# Patient Record
Sex: FEMALE | Race: OTHER | HISPANIC OR LATINO | Employment: UNEMPLOYED | URBAN - METROPOLITAN AREA
[De-identification: names, ages, dates, MRNs, and addresses within clinical notes are randomized per-mention and may not be internally consistent; named-entity substitution may affect disease eponyms.]

---

## 2017-02-10 LAB
ABO + RH BLD: NORMAL
C TRACH RRNA SPEC QL PROBE: NEGATIVE
HBV SURFACE AG SERPL QL IA: NEGATIVE
HCT VFR BLD AUTO: 38 % (ref 36–46)
HGB BLD-MCNC: 12 G/DL (ref 12–16)
INDIRECT COOMBS: NEGATIVE
MCV RBC AUTO: 97 FL (ref 82–108)
N GONORRHOEAE, AMPLIFIED DNA: NEGATIVE
PLATELET # BLD AUTO: 364 K/ΜL (ref 150–399)
RPR: NEGATIVE
RUBELLA IMMUNE STATUS: NORMAL

## 2017-03-10 DIAGNOSIS — Z34.91 NORMAL PREGNANCY IN FIRST TRIMESTER: Primary | ICD-10-CM

## 2017-03-21 ENCOUNTER — OFFICE VISIT (OUTPATIENT)
Dept: MATERNAL FETAL MEDICINE | Facility: CLINIC | Age: 27
End: 2017-03-21
Payer: MEDICAID

## 2017-03-21 DIAGNOSIS — Z36.89 ENCOUNTER FOR FETAL ANATOMIC SURVEY: Primary | ICD-10-CM

## 2017-03-21 DIAGNOSIS — Z34.91 NORMAL PREGNANCY IN FIRST TRIMESTER: ICD-10-CM

## 2017-03-21 PROCEDURE — 76816 OB US FOLLOW-UP PER FETUS: CPT | Mod: 26,S$PBB,, | Performed by: PEDIATRICS

## 2017-03-21 PROCEDURE — 99499 UNLISTED E&M SERVICE: CPT | Mod: S$PBB,,, | Performed by: PEDIATRICS

## 2017-03-21 PROCEDURE — 76816 OB US FOLLOW-UP PER FETUS: CPT | Mod: PBBFAC | Performed by: PEDIATRICS

## 2017-03-21 NOTE — PROGRESS NOTES
Indication  ========    Evaluation of fetal growth.    Method  ======    Transabdominal ultrasound examination.    Pregnancy  =========    [facility] pregnancy. Number of fetuses: 1.    Dating  ======    LMP on: 11/27/2016  GA by LMP 16 w + 2 d  ALYSSA by LMP: 9/3/2017  Ultrasound examination on: 3/21/2017  GA by U/S based upon: AC, BPD, Femur, HC  GA by U/S 16 w + 3 d  ALYSSA by U/S: 9/2/2017  Assigned: Dating performed on 03/21/2017, based on the LMP  Assigned GA 16 w + 2 d  Assigned ALYSSA: 9/3/2017    General Evaluation  ==============    Cardiac activity: present.  bpm.  Fetal movements: visualized.  Presentation: breech.  Placenta:  Placental site: anterior.  Umbilical cord: Cord vessels: 3 vessel cord.  Amniotic fluid: Amount of AF: normal amount.    Fetal Biometry  ============    Fetal Biometry  BPD 33.7 mm 55% 16w 3d Hadlock  OFD 43.7 mm 73% 16w 6d Gerardo  .9 mm 37% 16w 2d Hadlock  .1 mm 63% 16w 4d Hadlock  Femur 21.2 mm 47% 16w 2d Hadlock  Cerebellum tr 15.2 mm 21% 16w 1d Mazariegos  CM 2.5 mm 7% Nicolaides  Nuchal fold 2.95 mm  Humerus 21.9 mm 69% 16w 5d Gerardo   g 52% 16w 2d Hadlock  Calculated by: Hadlock (BPD-HC-AC-FL)  EFW (lb) 0 lb  EFW (oz) 6 oz  Cephalic index 0.77 23% Nicolaides  HC / AC 1.17 26% Hadlock  FL / BPD 0.63 64% Hadlock  FL / AC 0.20 48% Hadlock   bpm    Fetal Anatomy  ============    Cranium: normal  Lateral ventricles: normal  Choroid plexus: normal  Midline falx: suboptimal  Cavum septi pellucidi: suboptimal  Cerebellum: normal  Cisterna magna: normal  Lips: suboptimal  Profile: suboptimal  Nose: suboptimal  4-chamber view: suboptimal  RVOT: suboptimal  LVOT: suboptimal  Heart / Thorax: septum is sub opt  Aortic arch: suboptimal  Diaphragm: normal  Cord insertion: normal  Stomach: normal  Kidneys: normal  Bladder: normal  Genitals: normal  Cervical spine: suboptimal  Thoracic spine: suboptimal  Lumbar spine: suboptimal  Sacral spine: suboptimal  Arms: both  visualized  Legs: both visualized  Wants to know gender: no    Maternal Structures  ===============    Uterus / Cervix  Cervical length 48.0 mm              Impression  =========    Limited anatomy was negative. No anomalies seen.    AFV normal.    US used for dating. Biometry consistent and concordant with US generated dates.        Recommendation  ==============    With your permission, we would like to re-evaluate fetal growth and surveillance and maternal health in 4 - 6 weeks.    Thank you for allowing us to participate in the care of your patients. If you have any questions concerning today's consultation feel free to  contact me or one of my partners. We can be reached at [phone] during normal business hours. If you have a question after normal  business hours, please contact Labor and Delivery [phone] and the unit secretary will page our on call physician.

## 2017-03-24 ENCOUNTER — TELEPHONE (OUTPATIENT)
Dept: OBSTETRICS AND GYNECOLOGY | Facility: CLINIC | Age: 27
End: 2017-03-24

## 2017-03-24 NOTE — TELEPHONE ENCOUNTER
Called to schedule colp. No answer. VM recorded message was in Eritrean. Scheduled OB/Colpo appt. Mailed reminder.

## 2017-03-30 ENCOUNTER — INITIAL PRENATAL (OUTPATIENT)
Dept: OBSTETRICS AND GYNECOLOGY | Facility: CLINIC | Age: 27
End: 2017-03-30
Payer: MEDICAID

## 2017-03-30 VITALS — SYSTOLIC BLOOD PRESSURE: 120 MMHG | WEIGHT: 108.25 LBS | DIASTOLIC BLOOD PRESSURE: 70 MMHG

## 2017-03-30 DIAGNOSIS — Z34.02 ENCOUNTER FOR SUPERVISION OF NORMAL FIRST PREGNANCY IN SECOND TRIMESTER: Primary | ICD-10-CM

## 2017-03-30 DIAGNOSIS — R87.612 LGSIL ON PAP SMEAR OF CERVIX: ICD-10-CM

## 2017-03-30 PROCEDURE — 99212 OFFICE O/P EST SF 10 MIN: CPT | Mod: PBBFAC,PO | Performed by: STUDENT IN AN ORGANIZED HEALTH CARE EDUCATION/TRAINING PROGRAM

## 2017-03-30 PROCEDURE — 87086 URINE CULTURE/COLONY COUNT: CPT

## 2017-03-30 PROCEDURE — 57452 EXAM OF CERVIX W/SCOPE: CPT | Mod: PBBFAC,PO | Performed by: OBSTETRICS & GYNECOLOGY

## 2017-03-30 PROCEDURE — 99202 OFFICE O/P NEW SF 15 MIN: CPT | Mod: TH,25,S$PBB, | Performed by: STUDENT IN AN ORGANIZED HEALTH CARE EDUCATION/TRAINING PROGRAM

## 2017-03-30 PROCEDURE — 99999 PR PBB SHADOW E&M-EST. PATIENT-LVL II: CPT | Mod: PBBFAC,,, | Performed by: STUDENT IN AN ORGANIZED HEALTH CARE EDUCATION/TRAINING PROGRAM

## 2017-03-30 RX ORDER — FOLIC ACID, .BETA.-CAROTENE, ASCORBIC ACID, CHOLECALCIFEROL, .ALPHA.-TOCOPHEROL ACETATE, DL-, THIAMINE MONONITRATE, RIBOFLAVIN, NIACINAMIDE, PYRIDOXINE HYDROCHLORIDE, CYANOCOBALAMIN, CALCIUM PANTOTHENATE, CALCIUM CARBONATE, FERROUS FUMARATE, AND ZINC OXIDE 1; 1000; 100; 400; 30; 3; 3; 15; 20; 12; 7; 200; 29; 20 MG/1; [IU]/1; MG/1; [IU]/1; [IU]/1; MG/1; MG/1; MG/1; MG/1; UG/1; MG/1; MG/1; MG/1; MG/1
TABLET, CHEWABLE ORAL
Refills: 2 | COMMUNITY
Start: 2017-03-14

## 2017-03-30 NOTE — PROGRESS NOTES
Patient doing well today. She reports mild nausea. She otherwise denies pelvic cramping, denies vaginal bleeding, denies LOF.  She reports feeling some fetal movement    /70  Wt 49.1 kg (108 lb 3.9 oz)  LMP 2016    26 y.o., at 17w4d by Estimated Date of Delivery: 9/3/17  There is no problem list on file for this patient.    OB History    Para Term  AB SAB TAB Ectopic Multiple Living   1               # Outcome Date GA Lbr Vlad/2nd Weight Sex Delivery Anes PTL Lv   1 Current                   Dating reviewed    Allergies and problem list reviewed and updated    Medical and surgical history reviewed    Prenatal labs reviewed and updated    Physical Exam:  ABD: soft, gravid, nontender    Assessment:  Prenatal    Plan:   Records from [place]  LSIL on pap, colpo today, mild changes no biopsies, repeat pap PP  Appt with MFM   Declined quad screen  Will need HIV lab with T2 labs    Follow up 4 Weeks

## 2017-03-30 NOTE — MR AVS SNAPSHOT
[facility]  [address] [place] [address]  Phone: [phone]                  Klarissa Krause   3/30/2017 1:00 PM   Initial Prenatal    Descripción:  Female : 1990   Personal Médico:  Fernanda Cordova MD   Departamento:  [facility]           Razón de la kayleen     Routine Prenatal Visit     Headache     Morning Sickness           Diagnósticos de Esta Visita        Comentarios    Encounter for supervision of normal first pregnancy in second trimester    -  Primario     LGSIL on Pap smear of cervix                Lista de tareas           Citas próximas        Personal Médico Departamento Tfno del dpto    2017 9:20 AM ULTRASOUND, [facility] 4TH [facility] - Maternal Fetal Med [phone]      Metas (5 Years of Data)     Ninguna      [facility]     [facility] L[facility]nea de Enfermeras - Asistencia   Enfermeras registradas de [facility] pueden ayudarle a reservar josé kayleen, proveer educación para la hadley, asesoría clínica, y otros servicios de asesoramiento.   Llame para jamee servicio gratuito a [phone].             Medicamentos           Mensaje sobre Medicamentos     Verificar los cambios y / o adiciones a whitman régimen de medicación son los mismos que discutir con whitman médico. Si cualquiera de estos cambios o adiciones son incorrectos, por favor notifique a whitman proveedor de atención médica.             Verifique que la siguiente lista de medicamentos es josé representación exacta de los medicamentos que está tomando actualmente. Si no hay ningunos reportados, la lista puede estar en romano. Si no es correcta, por favor póngase en contacto con whitman proveedor de atención médica. Lleve esta lista con usted en izaiah de emergencia.           Medicamentos Actuales     PRENATAL 19 29 mg iron- 1 mg Chew TK 1 T PO QD           Información de referencia clínica           Prenatal Vitals     Enc. Date GA Prenatal Vitals Prenatal Pulse Pain Level Urine  Albumin/Glucose Edema Presentation Dilation/Effacement/Station    3/30/17 17w4d 120/70 / 49.1 kg (108 lb 3.9 oz)  / 152  0 Negative / Negative None / None / None        Fatmata signos vitales tenzin     PS Peso Ultima menstruación             120/70 49.1 kg (108 lb 3.9 oz) 2016         Alergias     A partir del:  3/30/2017        No Known Allergies      Vacunas     Administradas en la fecha de la visita:  3/30/2017        None      Orders Placed During Today's Visit      Órdenes normales de esta visita    ABO/Rh     CBC Without Differential     Jesus test, indirect, qualitative     Hbsag - Prenatal     Ob Cervical Screen     RPR     Rubella antibody, IgG     Urine culture       Language Assistance Services     ATTENTION: Language assistance services are available, free of charge. Please call [phone].      ATENCIÓN: Si habla español, tiene a whitman disposición servicios gratuitos de asistencia lingüística. Llame al [phone].     CHÚ Ý: N?u b?n nói Ti?ng Vi?t, có các d?ch v? h? tr? ngôn ng? mi?n phí dành cho b?n. G?i s? [phone].         [facility] cumple con las leyes federales aplicables de derechos civiles y no discrimina por motivos de bettina, color, origen nacional, edad, discapacidad, o sexo.                 Klarissa Krause   3/30/2017 1:00 PM   Initial Prenatal    Description:  Female : 1990   Provider:  Fernanda Cordova MD   Department:  [facility]/ [facility]           Reason for Visit     Routine Prenatal Visit     Headache     Morning Sickness           Diagnoses this Visit        Comments    Encounter for supervision of normal first pregnancy in second trimester    -  Primary     LGSIL on Pap smear of cervix                To Do List           Future Appointments        Provider Department Dept Phone    2017 9:20 AM ULTRASOUND, [facility] 4TH [facility] - Maternal Fetal Med [phone]      Goals     None      [facility]     [facility] Nurse Care Line -   Assistance  Unless otherwise directed by your provider, please contact [facility], our nurse care line that is available for 24/7 assistance.     Registered nurses in the [facility] provide: appointment scheduling, clinical advisement, health education, and other advisory services.  Call: [phone] (toll free)               Medications           Message regarding Medications     Verify the changes and/or additions to your medication regime listed below are the same as discussed with your clinician today.  If any of these changes or additions are incorrect, please notify your healthcare provider.             Verify that the below list of medications is an accurate representation of the medications you are currently taking.  If none reported, the list may be blank. If incorrect, please contact your healthcare provider. Carry this list with you in case of emergency.           Current Medications     PRENATAL 19 29 mg iron- 1 mg Chew TK 1 T PO QD           Clinical Reference Information           Prenatal Vitals     Enc. Date GA Prenatal Vitals Prenatal Pulse Pain Level Urine Albumin/Glucose Edema Presentation Dilation/Effacement/Station    3/30/17 17w4d 120/70 / 49.1 kg (108 lb 3.9 oz)  / 152  0 Negative / Negative None / None / None        Your Vitals Were     BP Weight Last Period             120/70 49.1 kg (108 lb 3.9 oz) 11/27/2016         Allergies as of 3/30/2017     No Known Allergies      Immunizations Administered on Date of Encounter - 3/30/2017     None      Orders Placed During Today's Visit      Normal Orders This Visit    ABO/Rh     CBC Without Differential     Jesus test, indirect, qualitative     Hbsag - Prenatal     Ob Cervical Screen     RPR     Rubella antibody, IgG     Urine culture       Language Assistance Services     ATTENTION: Language assistance services are available, free of charge. Please call [phone].      ATENCIÓN: Si doug sharma whitman disposición  servicios gratuitos de asistencia lingüística. Shaheed kurtz [phone].     [facility] Ý: N?u b?n nói Ti?ng Vi?t, có các d?ch v? h? tr? ngôn ng? mi?n phí dành cho b?n. G?i s? [phone].         [facility] complies with applicable Federal civil rights laws and does not discriminate on the basis of race, color, national origin, age, disability, or sex.

## 2017-03-30 NOTE — PROCEDURES
Colposcopy  Date/Time: 3/30/2017 2:04 PM  Performed by: JASMEET QUAN  Authorized by: JASMEET QUAN     Consent Done?:  Yes (Written)  Assistants?: Yes    List of assistants:  Fernanda Cordova MD   I was present for the entire procedure.    Colposcopy Site:  Cervix  Position:  Supine  Acrowhite Lesion? Yes    Atypical Vessels: No    Transformation Zone Adequate?: Yes    Biopsy?: No    ECC Performed?: No    LEEP Performed?: No     Patient tolerated the procedure well with no immediate complications.   Post-operative instructions were provided for the patient.   Patient was discharged and will follow up if any complications occur

## 2017-04-01 LAB — BACTERIA UR CULT: NO GROWTH

## 2017-04-18 ENCOUNTER — OFFICE VISIT (OUTPATIENT)
Dept: MATERNAL FETAL MEDICINE | Facility: CLINIC | Age: 27
End: 2017-04-18
Payer: MEDICAID

## 2017-04-18 DIAGNOSIS — Z36.89 ENCOUNTER FOR FETAL ANATOMIC SURVEY: ICD-10-CM

## 2017-04-18 PROCEDURE — 76805 OB US >/= 14 WKS SNGL FETUS: CPT | Mod: 26,S$PBB,, | Performed by: OBSTETRICS & GYNECOLOGY

## 2017-04-18 PROCEDURE — 99499 UNLISTED E&M SERVICE: CPT | Mod: S$PBB,,, | Performed by: OBSTETRICS & GYNECOLOGY

## 2017-04-18 PROCEDURE — 76805 OB US >/= 14 WKS SNGL FETUS: CPT | Mod: PBBFAC | Performed by: OBSTETRICS & GYNECOLOGY

## 2017-04-18 NOTE — PROGRESS NOTES
A detailed fetal anatomical ultrasound was completed today.  See official report in the imaging section of [facility].  Ultrasound noted no obvious fetal abnormalities.  Ultrasound findings consistent with established dating.  Rescan as clinically indicated.

## 2017-04-27 ENCOUNTER — ROUTINE PRENATAL (OUTPATIENT)
Dept: OBSTETRICS AND GYNECOLOGY | Facility: CLINIC | Age: 27
End: 2017-04-27
Payer: MEDICAID

## 2017-04-27 VITALS — DIASTOLIC BLOOD PRESSURE: 64 MMHG | SYSTOLIC BLOOD PRESSURE: 110 MMHG | WEIGHT: 115.5 LBS

## 2017-04-27 DIAGNOSIS — Z34.02 ENCOUNTER FOR SUPERVISION OF NORMAL FIRST PREGNANCY IN SECOND TRIMESTER: Primary | ICD-10-CM

## 2017-04-27 PROCEDURE — 99212 OFFICE O/P EST SF 10 MIN: CPT | Mod: PBBFAC,PO | Performed by: STUDENT IN AN ORGANIZED HEALTH CARE EDUCATION/TRAINING PROGRAM

## 2017-04-27 PROCEDURE — 99213 OFFICE O/P EST LOW 20 MIN: CPT | Mod: TH,S$PBB,, | Performed by: STUDENT IN AN ORGANIZED HEALTH CARE EDUCATION/TRAINING PROGRAM

## 2017-04-27 PROCEDURE — 99999 PR PBB SHADOW E&M-EST. PATIENT-LVL II: CPT | Mod: PBBFAC,,, | Performed by: STUDENT IN AN ORGANIZED HEALTH CARE EDUCATION/TRAINING PROGRAM

## 2017-04-27 RX ORDER — VALACYCLOVIR HYDROCHLORIDE 500 MG/1
TABLET, FILM COATED ORAL
Refills: 0 | COMMUNITY
Start: 2017-04-07 | End: 2017-08-02 | Stop reason: SDUPTHER

## 2017-04-27 NOTE — PROGRESS NOTES
Complaints today: none  Good fm.  Denies ctx, vb, lof.    /64  Wt 52.4 kg (115 lb 8.3 oz)  LMP 2016    26 y.o., at 21w4d by Estimated Date of Delivery: 9/3/17  Patient Active Problem List   Diagnosis    Encounter for supervision of normal first pregnancy in second trimester    LGSIL on Pap smear of cervix - colpo mild.  repeat pap pp     OB History    Para Term  AB SAB TAB Ectopic Multiple Living   1               # Outcome Date GA Lbr Vlad/2nd Weight Sex Delivery Anes PTL Lv   1 Current                   Dating reviewed    Allergies and problem list reviewed and updated    Medical and surgical history reviewed    Prenatal labs reviewed and updated    Physical Exam:  ABD: soft, gravid, nontender,     Assessment:  Klarissa was seen today for routine prenatal visit.    Diagnoses and all orders for this visit:    Encounter for supervision of normal first pregnancy in second trimester         Plan:      follow up 4Weeks, kick counts, labor precautions

## 2017-04-27 NOTE — MR AVS SNAPSHOT
[facility]  [address] [facility] [place] [address]  Phone: [phone]                  Klarissa Krause   2017 10:00 AM   Routine Prenatal    Descripción:  Female : 1990   Personal Médico:  Conchis Au MD   Departamento:  [facility]           Razón de la kayleen     Routine Prenatal Visit           Diagnósticos de Esta Visita        Comentarios    Encounter for supervision of normal first pregnancy in second trimester    -  Primario            Lista de tarshaunna           Namitas (5 Years of Data)     Ninguna      Follow-Up and Disposition     Return in about 4 weeks (around 2017).      [facility]     [facility] Línea de Enfermeras - Asistencia   Enfermeras registradas de [facility] pueden ayudarle a reservar josé kayleen, proveer educación para la hadley, asesoría clínica, y otros servicios de asesoramiento.   Llame para jamee servicio gratuito a [phone].             Medicamentos           Mensaje sobre Medicamentos     Verificar los cambios y / o adiciones a whitman régimen de medicación son los mismos que discutir con whitman médico. Si cualquiera de estos cambios o adiciones son incorrectos, por favor notifique a whitman proveedor de atención médica.             Verifique que la siguiente lista de medicamentos es josé representación exacta de los medicamentos que está tomando actualmente. Si no hay ningunos reportados, la lista puede estar en romano. Si no es correcta, por favor póngase en contacto con whitman proveedor de atención médica. Lleve esta lista con usted en izaiah de emergencia.           Medicamentos Actuales     PRENATAL 19 29 mg iron- 1 mg Chew TK 1 T PO QD    valacyclovir (VALTREX) 500 MG tablet TK 1 T PO  Q  12  H  FOR   3  DAYS           Información de referencia clínica           Prenatal Vitals     Enc. Date GA Prenatal Vitals Prenatal Pulse Pain Level Urine Albumin/Glucose Edema Presentation Dilation/Effacement/Station    17 21w4d 110/64  / 52.4 kg (115 lb 8.3 oz)   0 Negative / Negative       3/30/17 17w4d 120/70 / 49.1 kg (108 lb 3.9 oz)  / 152  0 Negative / Negative None / None / None        Fatmata signos vitales tenzin     PS Peso Ultima menstruación             110/64 52.4 kg (115 lb 8.3 oz) 2016         Alergias     A partir del:  2017        No Known Allergies      Vacunas     Administradas en la fecha de la visita:  2017        None      Language Assistance Services     ATTENTION: Language assistance services are available, free of charge. Please call [phone].      ATENCIÓN: Si habla español, tiene a whitman disposición servicios gratuitos de asistencia lingüística. Llame al [phone].     CHÚ Ý: N?u b?n nói Ti?ng Vi?t, có các d?ch v? h? tr? ngôn ng? mi?n phí dành cho b?n. G?i s? [phone].         [facility] - [facility]/ G[facility] cumple con las leyes federales aplicables de derechos civiles y no discrimina por motivos de bettina, color, origen nacional, edad, discapacidad, o sexo.                 Klarissa Jimi   2017 10:00 AM   Routine Prenatal    Description:  Female : 1990   Provider:  Conchis Au MD   Department:  [facility]/ G[facility]           Reason for Visit     Routine Prenatal Visit           Diagnoses this Visit        Comments    Encounter for supervision of normal first pregnancy in second trimester    -  Primary            To Do List           Goals     None      Follow-Up and Disposition     Return in about 4 weeks (around 2017).      [facility]     [facility] On Call Nurse Care Line -  Assistance  Unless otherwise directed by your provider, please contact [facility], our nurse care line that is available for  assistance.     Registered nurses in the [facility] provide: appointment scheduling, clinical advisement, health education, and other advisory services.  Call: [phone] (toll free)               Medications           Message regarding Medications      Verify the changes and/or additions to your medication regime listed below are the same as discussed with your clinician today.  If any of these changes or additions are incorrect, please notify your healthcare provider.             Verify that the below list of medications is an accurate representation of the medications you are currently taking.  If none reported, the list may be blank. If incorrect, please contact your healthcare provider. Carry this list with you in case of emergency.           Current Medications     PRENATAL 19 29 mg iron- 1 mg Chew TK 1 T PO QD    valacyclovir (VALTREX) 500 MG tablet TK 1 T PO  Q  12  H  FOR   3  DAYS           Clinical Reference Information           Prenatal Vitals     Enc. Date GA Prenatal Vitals Prenatal Pulse Pain Level Urine Albumin/Glucose Edema Presentation Dilation/Effacement/Station    4/27/17 21w4d 110/64 / 52.4 kg (115 lb 8.3 oz)   0 Negative / Negative       3/30/17 17w4d 120/70 / 49.1 kg (108 lb 3.9 oz)  / 152  0 Negative / Negative None / None / None        Your Vitals Were     BP Weight Last Period             110/64 52.4 kg (115 lb 8.3 oz) 11/27/2016         Allergies as of 4/27/2017     No Known Allergies      Immunizations Administered on Date of Encounter - 4/27/2017     None      Language Assistance Services     ATTENTION: Language assistance services are available, free of charge. Please call [phone].      ATENCIÓN: Si habla español, tiene a whitman disposición servicios gratuitos de asistencia lingüística. Llame al [phone].     [facility] Ý: N?u b?n nói Ti?ng Vi?t, có các d?ch v? h? tr? ngôn ng? mi?n phí dành cho b?n. G?i s? [phone].         [facility] complies with applicable Federal civil rights laws and does not discriminate on the basis of race, color, national origin, age, disability, or sex.

## 2017-05-25 ENCOUNTER — ROUTINE PRENATAL (OUTPATIENT)
Dept: OBSTETRICS AND GYNECOLOGY | Facility: CLINIC | Age: 27
End: 2017-05-25
Payer: MEDICAID

## 2017-05-25 VITALS — DIASTOLIC BLOOD PRESSURE: 60 MMHG | WEIGHT: 125 LBS | SYSTOLIC BLOOD PRESSURE: 112 MMHG

## 2017-05-25 DIAGNOSIS — Z34.02 ENCOUNTER FOR SUPERVISION OF NORMAL FIRST PREGNANCY IN SECOND TRIMESTER: Primary | ICD-10-CM

## 2017-05-25 PROCEDURE — 99213 OFFICE O/P EST LOW 20 MIN: CPT | Mod: TH,S$PBB,, | Performed by: STUDENT IN AN ORGANIZED HEALTH CARE EDUCATION/TRAINING PROGRAM

## 2017-05-25 PROCEDURE — 99999 PR PBB SHADOW E&M-EST. PATIENT-LVL II: CPT | Mod: PBBFAC,,, | Performed by: STUDENT IN AN ORGANIZED HEALTH CARE EDUCATION/TRAINING PROGRAM

## 2017-05-25 PROCEDURE — 99212 OFFICE O/P EST SF 10 MIN: CPT | Mod: PBBFAC,PO | Performed by: STUDENT IN AN ORGANIZED HEALTH CARE EDUCATION/TRAINING PROGRAM

## 2017-08-02 ENCOUNTER — ROUTINE PRENATAL (OUTPATIENT)
Dept: OBSTETRICS AND GYNECOLOGY | Facility: CLINIC | Age: 27
End: 2017-08-02
Payer: MEDICAID

## 2017-08-02 VITALS — SYSTOLIC BLOOD PRESSURE: 110 MMHG | WEIGHT: 139.31 LBS | DIASTOLIC BLOOD PRESSURE: 70 MMHG

## 2017-08-02 DIAGNOSIS — B00.9 HSV (HERPES SIMPLEX VIRUS) INFECTION: ICD-10-CM

## 2017-08-02 DIAGNOSIS — Z34.02 ENCOUNTER FOR SUPERVISION OF NORMAL FIRST PREGNANCY IN SECOND TRIMESTER: Primary | ICD-10-CM

## 2017-08-02 PROCEDURE — 99999 PR PBB SHADOW E&M-EST. PATIENT-LVL III: CPT | Mod: PBBFAC,,, | Performed by: STUDENT IN AN ORGANIZED HEALTH CARE EDUCATION/TRAINING PROGRAM

## 2017-08-02 PROCEDURE — 99213 OFFICE O/P EST LOW 20 MIN: CPT | Mod: TH,S$PBB,, | Performed by: STUDENT IN AN ORGANIZED HEALTH CARE EDUCATION/TRAINING PROGRAM

## 2017-08-02 PROCEDURE — 99213 OFFICE O/P EST LOW 20 MIN: CPT | Mod: PBBFAC,PO | Performed by: STUDENT IN AN ORGANIZED HEALTH CARE EDUCATION/TRAINING PROGRAM

## 2017-08-02 PROCEDURE — 87081 CULTURE SCREEN ONLY: CPT

## 2017-08-02 RX ORDER — VALACYCLOVIR HYDROCHLORIDE 500 MG/1
500 TABLET, FILM COATED ORAL 2 TIMES DAILY
Qty: 60 TABLET | Refills: 1 | Status: SHIPPED | OUTPATIENT
Start: 2017-08-02 | End: 2017-08-15 | Stop reason: SDUPTHER

## 2017-08-02 NOTE — PROGRESS NOTES
Complaints today: none.     /70   Wt 63.2 kg (139 lb 5.3 oz)   LMP 2016     26 y.o., at 35w3d by Estimated Date of Delivery: 9/3/17  Patient Active Problem List   Diagnosis    Encounter for supervision of normal first pregnancy in second trimester    LGSIL on Pap smear of cervix - colpo mild.  repeat pap pp     OB History    Para Term  AB Living   1             SAB TAB Ectopic Multiple Live Births                  # Outcome Date GA Lbr Vlad/2nd Weight Sex Delivery Anes PTL Lv   1 Current                   Dating reviewed    Allergies and problem list reviewed and updated    Medical and surgical history reviewed    Prenatal labs reviewed and updated    Physical Exam:  ABD: soft, gravid, nontender, fundal height at 35 weeks    Assessment:  G1 at 35w3d    Plan:   IUP at 35weeks  - GBS collected and sent today  - 3T labs ordered  - A1C ordered as patient did not undergo glucose challenge test    Inconsistent Pre  Care  - Patient dated by 16 week ultrasound, consistent with LMP  - Attempting to get full records at this time.    HSV 2  - Valtrex is on her MAR. On PMH. Patient reports was told she had herpes here in January. However, she was not seen by [facility] in January. AFTER REVIEWING [place] RECORDS IT APPEARS PATIENT TESTED POSITIVE FOR HSV 2 IN MARCH. PATIENT ADMITS TO CLUSTERS AND ERUPTIONS AROUND THAT TIME. Counseled patient she will need to be on valtrex 500mg BID until delivery. Will send to pharmacy    Tyrone Brooks MD  OB/GYN PGY-2   Pager: [phone]

## 2017-08-02 NOTE — PROGRESS NOTES
Needs labs, no OB glucose screen recorded, not seen since 5/25/17  Labs ordered, apparently pt had culture dx of herpes on 3/15/2017. Valtrex ordered, patient informed that she must start taking Valtrex.  Doing well, no questions,no problems.  I have reviewed the resident's note, evaluated the patient and agree with the diagnosis and management plan.

## 2017-08-04 LAB — BACTERIA SPEC AEROBE CULT: NORMAL

## 2017-08-08 ENCOUNTER — LAB VISIT (OUTPATIENT)
Dept: LAB | Facility: HOSPITAL | Age: 27
End: 2017-08-08
Attending: OBSTETRICS & GYNECOLOGY
Payer: MEDICAID

## 2017-08-08 ENCOUNTER — ROUTINE PRENATAL (OUTPATIENT)
Dept: OBSTETRICS AND GYNECOLOGY | Facility: CLINIC | Age: 27
End: 2017-08-08
Payer: MEDICAID

## 2017-08-08 VITALS — WEIGHT: 139.56 LBS | DIASTOLIC BLOOD PRESSURE: 64 MMHG | SYSTOLIC BLOOD PRESSURE: 118 MMHG

## 2017-08-08 DIAGNOSIS — Z34.02 ENCOUNTER FOR SUPERVISION OF NORMAL FIRST PREGNANCY IN SECOND TRIMESTER: ICD-10-CM

## 2017-08-08 DIAGNOSIS — Z3A.36 36 WEEKS GESTATION OF PREGNANCY: Primary | ICD-10-CM

## 2017-08-08 DIAGNOSIS — Z3A.36 36 WEEKS GESTATION OF PREGNANCY: ICD-10-CM

## 2017-08-08 DIAGNOSIS — Z34.93 ENCOUNTER FOR SUPERVISION OF NORMAL PREGNANCY IN THIRD TRIMESTER, UNSPECIFIED GRAVIDITY: ICD-10-CM

## 2017-08-08 LAB
BASOPHILS # BLD AUTO: 0.02 K/UL
BASOPHILS NFR BLD: 0.3 %
DIFFERENTIAL METHOD: ABNORMAL
EOSINOPHIL # BLD AUTO: 0 K/UL
EOSINOPHIL NFR BLD: 0.2 %
ERYTHROCYTE [DISTWIDTH] IN BLOOD BY AUTOMATED COUNT: 14.3 %
GLUCOSE SERPL-MCNC: 136 MG/DL
HCT VFR BLD AUTO: 30.8 %
HGB BLD-MCNC: 9.9 G/DL
LYMPHOCYTES # BLD AUTO: 1.2 K/UL
LYMPHOCYTES NFR BLD: 18.3 %
MCH RBC QN AUTO: 26.6 PG
MCHC RBC AUTO-ENTMCNC: 32.1 G/DL
MCV RBC AUTO: 83 FL
MONOCYTES # BLD AUTO: 0.6 K/UL
MONOCYTES NFR BLD: 9 %
NEUTROPHILS # BLD AUTO: 4.6 K/UL
NEUTROPHILS NFR BLD: 71.9 %
PLATELET # BLD AUTO: 301 K/UL
PMV BLD AUTO: 11.1 FL
RBC # BLD AUTO: 3.72 M/UL
WBC # BLD AUTO: 6.35 K/UL

## 2017-08-08 PROCEDURE — 83036 HEMOGLOBIN GLYCOSYLATED A1C: CPT

## 2017-08-08 PROCEDURE — 99213 OFFICE O/P EST LOW 20 MIN: CPT | Mod: TH,S$PBB,, | Performed by: ADVANCED PRACTICE MIDWIFE

## 2017-08-08 PROCEDURE — 3008F BODY MASS INDEX DOCD: CPT | Mod: ,,, | Performed by: ADVANCED PRACTICE MIDWIFE

## 2017-08-08 PROCEDURE — 85025 COMPLETE CBC W/AUTO DIFF WBC: CPT

## 2017-08-08 PROCEDURE — 99999 PR PBB SHADOW E&M-EST. PATIENT-LVL II: CPT | Mod: PBBFAC,,, | Performed by: ADVANCED PRACTICE MIDWIFE

## 2017-08-08 PROCEDURE — 36415 COLL VENOUS BLD VENIPUNCTURE: CPT

## 2017-08-08 PROCEDURE — 86703 HIV-1/HIV-2 1 RESULT ANTBDY: CPT

## 2017-08-08 PROCEDURE — 86592 SYPHILIS TEST NON-TREP QUAL: CPT

## 2017-08-08 PROCEDURE — 82947 ASSAY GLUCOSE BLOOD QUANT: CPT

## 2017-08-08 NOTE — PROGRESS NOTES
In with no c/o or concerns, reports good FM, reinforced BID. 3rd tri labs ordered today. Denies LOF or bleeding. Reviewed s/s active labor, rom, bleeding and if occur report to L&D.

## 2017-08-09 LAB
ESTIMATED AVG GLUCOSE: 97 MG/DL
HBA1C MFR BLD HPLC: 5 %
HIV 1+2 AB+HIV1 P24 AG SERPL QL IA: NEGATIVE
RPR SER QL: NORMAL

## 2017-08-14 ENCOUNTER — TELEPHONE (OUTPATIENT)
Dept: OBSTETRICS AND GYNECOLOGY | Facility: CLINIC | Age: 27
End: 2017-08-14

## 2017-08-15 ENCOUNTER — ROUTINE PRENATAL (OUTPATIENT)
Dept: OBSTETRICS AND GYNECOLOGY | Facility: CLINIC | Age: 27
End: 2017-08-15
Payer: MEDICAID

## 2017-08-15 VITALS — SYSTOLIC BLOOD PRESSURE: 110 MMHG | WEIGHT: 144.19 LBS | DIASTOLIC BLOOD PRESSURE: 78 MMHG

## 2017-08-15 DIAGNOSIS — Z3A.37 37 WEEKS GESTATION OF PREGNANCY: ICD-10-CM

## 2017-08-15 DIAGNOSIS — B00.9 HSV (HERPES SIMPLEX VIRUS) INFECTION: Primary | ICD-10-CM

## 2017-08-15 PROCEDURE — 99214 OFFICE O/P EST MOD 30 MIN: CPT | Mod: TH,S$PBB,, | Performed by: OBSTETRICS & GYNECOLOGY

## 2017-08-15 PROCEDURE — 99212 OFFICE O/P EST SF 10 MIN: CPT | Mod: PBBFAC,PO | Performed by: OBSTETRICS & GYNECOLOGY

## 2017-08-15 PROCEDURE — 99999 PR PBB SHADOW E&M-EST. PATIENT-LVL II: CPT | Mod: PBBFAC,,, | Performed by: OBSTETRICS & GYNECOLOGY

## 2017-08-15 PROCEDURE — 3008F BODY MASS INDEX DOCD: CPT | Mod: ,,, | Performed by: OBSTETRICS & GYNECOLOGY

## 2017-08-15 RX ORDER — VALACYCLOVIR HYDROCHLORIDE 500 MG/1
500 TABLET, FILM COATED ORAL 2 TIMES DAILY
Qty: 60 TABLET | Refills: 1 | Status: SHIPPED | OUTPATIENT
Start: 2017-08-15 | End: 2022-07-07

## 2017-08-15 NOTE — TELEPHONE ENCOUNTER
----- Message from Prachi Russell LPN sent at 8/14/2017  1:21 PM CDT -----  Mild anemia.  Needs to take 325 mg in addition to pnv

## 2017-08-15 NOTE — TELEPHONE ENCOUNTER
Called to inform pt that she has mild anemia and needs to take 325 mg in addition to PNV per Dr. Polanco. No answer. Left VM Message.

## 2017-08-15 NOTE — PROGRESS NOTES
Complaints today:Doing well.  Rare ctx.    /78   Wt 65.4 kg (144 lb 2.9 oz)   LMP 2016     26 y.o., at 37w2d by Estimated Date of Delivery: 9/3/17  Patient Active Problem List   Diagnosis    Encounter for supervision of normal first pregnancy in second trimester    LGSIL on Pap smear of cervix - colpo mild.  repeat pap pp    HSV (herpes simplex virus) infection     OB History    Para Term  AB Living   1             SAB TAB Ectopic Multiple Live Births                  # Outcome Date GA Lbr Vlad/2nd Weight Sex Delivery Anes PTL Lv   1 Current                   Dating reviewed    Allergies and problem list reviewed and updated    Medical and surgical history reviewed    Prenatal labs reviewed and updated    Physical Exam:  ABD: soft, gravid, nontender, FH 37; SVE:  .5/50/-3    Assessment:  IUP at 37w2d     Plan:   Labor prec   follow up 1 Weeks, kick counts, labor precautions    Will start Valtrex for HSV prophylaxis.

## 2017-08-24 ENCOUNTER — ROUTINE PRENATAL (OUTPATIENT)
Dept: OBSTETRICS AND GYNECOLOGY | Facility: CLINIC | Age: 27
End: 2017-08-24
Payer: MEDICAID

## 2017-08-24 VITALS — WEIGHT: 145.75 LBS | SYSTOLIC BLOOD PRESSURE: 110 MMHG | DIASTOLIC BLOOD PRESSURE: 70 MMHG

## 2017-08-24 DIAGNOSIS — Z34.03 ENCOUNTER FOR SUPERVISION OF NORMAL FIRST PREGNANCY IN THIRD TRIMESTER: Primary | ICD-10-CM

## 2017-08-24 PROCEDURE — 3008F BODY MASS INDEX DOCD: CPT | Mod: ,,, | Performed by: STUDENT IN AN ORGANIZED HEALTH CARE EDUCATION/TRAINING PROGRAM

## 2017-08-24 PROCEDURE — 99213 OFFICE O/P EST LOW 20 MIN: CPT | Mod: TH,S$PBB,, | Performed by: STUDENT IN AN ORGANIZED HEALTH CARE EDUCATION/TRAINING PROGRAM

## 2017-08-24 PROCEDURE — 99213 OFFICE O/P EST LOW 20 MIN: CPT | Mod: PBBFAC,PO | Performed by: STUDENT IN AN ORGANIZED HEALTH CARE EDUCATION/TRAINING PROGRAM

## 2017-08-24 PROCEDURE — 99999 PR PBB SHADOW E&M-EST. PATIENT-LVL III: CPT | Mod: PBBFAC,,, | Performed by: STUDENT IN AN ORGANIZED HEALTH CARE EDUCATION/TRAINING PROGRAM

## 2017-08-24 NOTE — PROGRESS NOTES
Complaints today: Denies nausea/vomiting, Cp, SOB, contractions, vaginal bleeding, or loss of fluid.    /70   Wt 66.1 kg (145 lb 11.6 oz)   LMP 2016     27 y.o., at 38w4d by Estimated Date of Delivery: 9/3/17  Patient Active Problem List   Diagnosis    Encounter for supervision of normal first pregnancy in second trimester    LGSIL on Pap smear of cervix - colpo mild.  repeat pap pp    HSV (herpes simplex virus) infection     OB History    Para Term  AB Living   1             SAB TAB Ectopic Multiple Live Births                  # Outcome Date GA Lbr Vlad/2nd Weight Sex Delivery Anes PTL Lv   1 Current                   Dating reviewed    Allergies and problem list reviewed and updated    Medical and surgical history reviewed    Prenatal labs reviewed and updated    Physical Exam:  ABD: soft, gravid, nontender,     Assessment:  Klarissa was seen today for routine prenatal visit.    Diagnoses and all orders for this visit:    HSV (herpes simplex virus) infection    Encounter for supervision of normal first pregnancy in third trimester          Plan:   Klarissa was seen today for routine prenatal visit.    Diagnoses and all orders for this visit:    HSV (herpes simplex virus) infection    Encounter for supervision of normal first pregnancy in third trimester    - Continue taking valtrex  - Speculum exam upon labor      follow up 1Weeks, kick counts, labor precautions given

## 2017-08-29 ENCOUNTER — ANESTHESIA EVENT (OUTPATIENT)
Dept: OBSTETRICS AND GYNECOLOGY | Facility: OTHER | Age: 27
End: 2017-08-29
Payer: MEDICAID

## 2017-08-29 ENCOUNTER — ANESTHESIA (OUTPATIENT)
Dept: OBSTETRICS AND GYNECOLOGY | Facility: OTHER | Age: 27
End: 2017-08-29
Payer: MEDICAID

## 2017-08-29 ENCOUNTER — HOSPITAL ENCOUNTER (INPATIENT)
Facility: OTHER | Age: 27
LOS: 2 days | Discharge: HOME | End: 2017-08-31
Attending: OBSTETRICS & GYNECOLOGY | Admitting: OBSTETRICS & GYNECOLOGY
Payer: MEDICAID

## 2017-08-29 DIAGNOSIS — O42.90 PROM (PREMATURE RUPTURE OF MEMBRANES): ICD-10-CM

## 2017-08-29 DIAGNOSIS — Z3A.39 39 WEEKS GESTATION OF PREGNANCY: ICD-10-CM

## 2017-08-29 DIAGNOSIS — O42.90 PREMATURE RUPTURE OF MEMBRANES, UNSPECIFIED DURATION TO ONSET OF LABOR, UNSPECIFIED GESTATIONAL AGE: ICD-10-CM

## 2017-08-29 PROBLEM — R03.0 ELEVATED BP WITHOUT DIAGNOSIS OF HYPERTENSION: Status: ACTIVE | Noted: 2017-08-29

## 2017-08-29 LAB
ABO + RH BLD: NORMAL
ALBUMIN SERPL BCP-MCNC: 2.6 G/DL
ALLENS TEST: ABNORMAL
ALP SERPL-CCNC: 240 U/L
ALT SERPL W/O P-5'-P-CCNC: 8 U/L
ANION GAP SERPL CALC-SCNC: 12 MMOL/L
AST SERPL-CCNC: 17 U/L
BASOPHILS # BLD AUTO: 0.02 K/UL
BASOPHILS NFR BLD: 0.2 %
BILIRUB SERPL-MCNC: 0.4 MG/DL
BLD GP AB SCN CELLS X3 SERPL QL: NORMAL
BUN SERPL-MCNC: 7 MG/DL
CALCIUM SERPL-MCNC: 9.5 MG/DL
CHLORIDE SERPL-SCNC: 110 MMOL/L
CO2 SERPL-SCNC: 15 MMOL/L
CREAT SERPL-MCNC: 0.7 MG/DL
CREAT UR-MCNC: 24.9 MG/DL
DIFFERENTIAL METHOD: ABNORMAL
EOSINOPHIL # BLD AUTO: 0.1 K/UL
EOSINOPHIL NFR BLD: 0.5 %
ERYTHROCYTE [DISTWIDTH] IN BLOOD BY AUTOMATED COUNT: 15.5 %
EST. GFR  (AFRICAN AMERICAN): >60 ML/MIN/1.73 M^2
EST. GFR  (NON AFRICAN AMERICAN): >60 ML/MIN/1.73 M^2
GLUCOSE SERPL-MCNC: 76 MG/DL
HCO3 UR-SCNC: 19.7 MMOL/L (ref 24–28)
HCT VFR BLD AUTO: 31 %
HGB BLD-MCNC: 9.7 G/DL
LYMPHOCYTES # BLD AUTO: 2.1 K/UL
LYMPHOCYTES NFR BLD: 23.2 %
MCH RBC QN AUTO: 25.3 PG
MCHC RBC AUTO-ENTMCNC: 31.3 G/DL
MCV RBC AUTO: 81 FL
MONOCYTES # BLD AUTO: 0.7 K/UL
MONOCYTES NFR BLD: 7.6 %
NEUTROPHILS # BLD AUTO: 6.3 K/UL
NEUTROPHILS NFR BLD: 68.1 %
PCO2 BLDA: 65.9 MMHG (ref 35–45)
PH SMN: 7.08 [PH] (ref 7.35–7.45)
PLATELET # BLD AUTO: 276 K/UL
PMV BLD AUTO: 11.2 FL
PO2 BLDA: 9 MMHG (ref 80–100)
POC BE: -10 MMOL/L
POC SATURATED O2: 5 % (ref 95–100)
POTASSIUM SERPL-SCNC: 3.9 MMOL/L
PROT SERPL-MCNC: 7.5 G/DL
PROT UR-MCNC: 36 MG/DL
PROT/CREAT RATIO, UR: 1.45
RBC # BLD AUTO: 3.84 M/UL
SAMPLE: ABNORMAL
SITE: ABNORMAL
SODIUM SERPL-SCNC: 137 MMOL/L
WBC # BLD AUTO: 9.22 K/UL

## 2017-08-29 PROCEDURE — 86850 RBC ANTIBODY SCREEN: CPT

## 2017-08-29 PROCEDURE — 59409 OBSTETRICAL CARE: CPT | Mod: AA,,, | Performed by: ANESTHESIOLOGY

## 2017-08-29 PROCEDURE — 59409 OBSTETRICAL CARE: CPT | Mod: GB,,, | Performed by: OBSTETRICS & GYNECOLOGY

## 2017-08-29 PROCEDURE — 11000001 HC ACUTE MED/SURG PRIVATE ROOM

## 2017-08-29 PROCEDURE — 63600175 PHARM REV CODE 636 W HCPCS: Performed by: STUDENT IN AN ORGANIZED HEALTH CARE EDUCATION/TRAINING PROGRAM

## 2017-08-29 PROCEDURE — 3E033VJ INTRODUCTION OF OTHER HORMONE INTO PERIPHERAL VEIN, PERCUTANEOUS APPROACH: ICD-10-PCS | Performed by: OBSTETRICS & GYNECOLOGY

## 2017-08-29 PROCEDURE — 84156 ASSAY OF PROTEIN URINE: CPT

## 2017-08-29 PROCEDURE — 25000003 PHARM REV CODE 250: Performed by: STUDENT IN AN ORGANIZED HEALTH CARE EDUCATION/TRAINING PROGRAM

## 2017-08-29 PROCEDURE — 62326 NJX INTERLAMINAR LMBR/SAC: CPT | Performed by: ANESTHESIOLOGY

## 2017-08-29 PROCEDURE — 27800517 HC TRAY,EPIDURAL-CONTINUOUS: Performed by: ANESTHESIOLOGY

## 2017-08-29 PROCEDURE — 0KQM0ZZ REPAIR PERINEUM MUSCLE, OPEN APPROACH: ICD-10-PCS | Performed by: OBSTETRICS & GYNECOLOGY

## 2017-08-29 PROCEDURE — 99285 EMERGENCY DEPT VISIT HI MDM: CPT | Mod: 25

## 2017-08-29 PROCEDURE — 59025 FETAL NON-STRESS TEST: CPT | Mod: 26,,, | Performed by: OBSTETRICS & GYNECOLOGY

## 2017-08-29 PROCEDURE — 72100002 HC LABOR CARE, 1ST 8 HOURS

## 2017-08-29 PROCEDURE — 86920 COMPATIBILITY TEST SPIN: CPT

## 2017-08-29 PROCEDURE — 59025 FETAL NON-STRESS TEST: CPT

## 2017-08-29 PROCEDURE — 27200710 HC EPIDURAL INFUSION PUMP SET: Performed by: ANESTHESIOLOGY

## 2017-08-29 PROCEDURE — 25000003 PHARM REV CODE 250: Performed by: ANESTHESIOLOGY

## 2017-08-29 PROCEDURE — 63600175 PHARM REV CODE 636 W HCPCS: Performed by: ANESTHESIOLOGY

## 2017-08-29 PROCEDURE — 99283 EMERGENCY DEPT VISIT LOW MDM: CPT | Mod: 25,,, | Performed by: OBSTETRICS & GYNECOLOGY

## 2017-08-29 PROCEDURE — 80053 COMPREHEN METABOLIC PANEL: CPT

## 2017-08-29 PROCEDURE — 85025 COMPLETE CBC W/AUTO DIFF WBC: CPT

## 2017-08-29 PROCEDURE — 25000003 PHARM REV CODE 250: Performed by: OBSTETRICS & GYNECOLOGY

## 2017-08-29 PROCEDURE — 86900 BLOOD TYPING SEROLOGIC ABO: CPT

## 2017-08-29 RX ORDER — ONDANSETRON 2 MG/ML
4 INJECTION INTRAMUSCULAR; INTRAVENOUS ONCE
Status: COMPLETED | OUTPATIENT
Start: 2017-08-29 | End: 2017-08-29

## 2017-08-29 RX ORDER — BUPIVACAINE HYDROCHLORIDE 2.5 MG/ML
INJECTION, SOLUTION EPIDURAL; INFILTRATION; INTRACAUDAL
Status: DISPENSED
Start: 2017-08-29 | End: 2017-08-29

## 2017-08-29 RX ORDER — ONDANSETRON 8 MG/1
8 TABLET, ORALLY DISINTEGRATING ORAL EVERY 8 HOURS PRN
Status: DISCONTINUED | OUTPATIENT
Start: 2017-08-29 | End: 2017-09-01 | Stop reason: HOSPADM

## 2017-08-29 RX ORDER — MISOPROSTOL 200 UG/1
200 TABLET ORAL EVERY 6 HOURS
Status: COMPLETED | OUTPATIENT
Start: 2017-08-29 | End: 2017-08-30

## 2017-08-29 RX ORDER — FENTANYL CITRATE 50 UG/ML
INJECTION, SOLUTION INTRAMUSCULAR; INTRAVENOUS
Status: DISPENSED
Start: 2017-08-29 | End: 2017-08-30

## 2017-08-29 RX ORDER — SODIUM CHLORIDE, SODIUM LACTATE, POTASSIUM CHLORIDE, CALCIUM CHLORIDE 600; 310; 30; 20 MG/100ML; MG/100ML; MG/100ML; MG/100ML
INJECTION, SOLUTION INTRAVENOUS CONTINUOUS
Status: DISCONTINUED | OUTPATIENT
Start: 2017-08-29 | End: 2017-08-29

## 2017-08-29 RX ORDER — MISOPROSTOL 200 UG/1
200 TABLET ORAL
Status: DISCONTINUED | OUTPATIENT
Start: 2017-08-29 | End: 2017-08-29

## 2017-08-29 RX ORDER — HYDROCODONE BITARTRATE AND ACETAMINOPHEN 10; 325 MG/1; MG/1
1 TABLET ORAL EVERY 4 HOURS PRN
Status: DISCONTINUED | OUTPATIENT
Start: 2017-08-29 | End: 2017-09-01 | Stop reason: HOSPADM

## 2017-08-29 RX ORDER — FENTANYL/BUPIVACAINE/NS/PF 2MCG/ML-.1
PLASTIC BAG, INJECTION (ML) INJECTION CONTINUOUS
Status: DISCONTINUED | OUTPATIENT
Start: 2017-08-29 | End: 2017-08-29

## 2017-08-29 RX ORDER — FENTANYL/BUPIVACAINE/NS/PF 2MCG/ML-.1
PLASTIC BAG, INJECTION (ML) INJECTION CONTINUOUS PRN
Status: DISCONTINUED | OUTPATIENT
Start: 2017-08-29 | End: 2017-08-29

## 2017-08-29 RX ORDER — IBUPROFEN 600 MG/1
600 TABLET ORAL EVERY 6 HOURS
Status: DISCONTINUED | OUTPATIENT
Start: 2017-08-30 | End: 2017-09-01 | Stop reason: HOSPADM

## 2017-08-29 RX ORDER — HYDROCODONE BITARTRATE AND ACETAMINOPHEN 5; 325 MG/1; MG/1
1 TABLET ORAL EVERY 4 HOURS PRN
Status: DISCONTINUED | OUTPATIENT
Start: 2017-08-29 | End: 2017-09-01 | Stop reason: HOSPADM

## 2017-08-29 RX ORDER — OXYTOCIN/RINGER'S LACTATE 20/1000 ML
2 PLASTIC BAG, INJECTION (ML) INTRAVENOUS CONTINUOUS
Status: DISCONTINUED | OUTPATIENT
Start: 2017-08-29 | End: 2017-08-29

## 2017-08-29 RX ORDER — DOCUSATE SODIUM 100 MG/1
200 CAPSULE, LIQUID FILLED ORAL 2 TIMES DAILY PRN
Status: DISCONTINUED | OUTPATIENT
Start: 2017-08-29 | End: 2017-09-01 | Stop reason: HOSPADM

## 2017-08-29 RX ORDER — FENTANYL CITRATE 50 UG/ML
INJECTION, SOLUTION INTRAMUSCULAR; INTRAVENOUS
Status: DISPENSED
Start: 2017-08-29 | End: 2017-08-29

## 2017-08-29 RX ORDER — HYDROCORTISONE 25 MG/G
CREAM TOPICAL 3 TIMES DAILY PRN
Status: DISCONTINUED | OUTPATIENT
Start: 2017-08-29 | End: 2017-09-01 | Stop reason: HOSPADM

## 2017-08-29 RX ORDER — BUPIVACAINE HYDROCHLORIDE 2.5 MG/ML
INJECTION, SOLUTION EPIDURAL; INFILTRATION; INTRACAUDAL
Status: DISPENSED
Start: 2017-08-29 | End: 2017-08-30

## 2017-08-29 RX ORDER — OXYTOCIN/RINGER'S LACTATE 20/1000 ML
41.65 PLASTIC BAG, INJECTION (ML) INTRAVENOUS CONTINUOUS
Status: DISCONTINUED | OUTPATIENT
Start: 2017-08-29 | End: 2017-08-29

## 2017-08-29 RX ORDER — DIPHENOXYLATE HYDROCHLORIDE AND ATROPINE SULFATE 2.5; .025 MG/1; MG/1
1 TABLET ORAL ONCE
Status: COMPLETED | OUTPATIENT
Start: 2017-08-29 | End: 2017-08-29

## 2017-08-29 RX ORDER — LIDOCAINE HYDROCHLORIDE AND EPINEPHRINE 15; 5 MG/ML; UG/ML
INJECTION, SOLUTION EPIDURAL
Status: DISCONTINUED | OUTPATIENT
Start: 2017-08-29 | End: 2017-08-29

## 2017-08-29 RX ORDER — FAMOTIDINE 10 MG/ML
20 INJECTION INTRAVENOUS ONCE
Status: DISCONTINUED | OUTPATIENT
Start: 2017-08-29 | End: 2017-08-29

## 2017-08-29 RX ORDER — FENTANYL/BUPIVACAINE/NS/PF 2MCG/ML-.1
PLASTIC BAG, INJECTION (ML) INJECTION
Status: DISPENSED
Start: 2017-08-29 | End: 2017-08-29

## 2017-08-29 RX ORDER — BUPIVACAINE HYDROCHLORIDE 2.5 MG/ML
INJECTION, SOLUTION EPIDURAL; INFILTRATION; INTRACAUDAL
Status: DISCONTINUED | OUTPATIENT
Start: 2017-08-29 | End: 2017-08-29

## 2017-08-29 RX ORDER — ACETAMINOPHEN 500 MG
1000 TABLET ORAL ONCE
Status: COMPLETED | OUTPATIENT
Start: 2017-08-29 | End: 2017-08-29

## 2017-08-29 RX ORDER — SODIUM CITRATE AND CITRIC ACID MONOHYDRATE 334; 500 MG/5ML; MG/5ML
30 SOLUTION ORAL ONCE
Status: DISCONTINUED | OUTPATIENT
Start: 2017-08-29 | End: 2017-08-29

## 2017-08-29 RX ORDER — DIPHENHYDRAMINE HCL 25 MG
25 CAPSULE ORAL EVERY 4 HOURS PRN
Status: DISCONTINUED | OUTPATIENT
Start: 2017-08-29 | End: 2017-09-01 | Stop reason: HOSPADM

## 2017-08-29 RX ORDER — OXYTOCIN/RINGER'S LACTATE 20/1000 ML
20 PLASTIC BAG, INJECTION (ML) INTRAVENOUS ONCE
Status: DISCONTINUED | OUTPATIENT
Start: 2017-08-29 | End: 2017-08-29

## 2017-08-29 RX ORDER — ONDANSETRON 8 MG/1
8 TABLET, ORALLY DISINTEGRATING ORAL EVERY 8 HOURS PRN
Status: DISCONTINUED | OUTPATIENT
Start: 2017-08-29 | End: 2017-08-29

## 2017-08-29 RX ORDER — FENTANYL CITRATE 50 UG/ML
INJECTION, SOLUTION INTRAMUSCULAR; INTRAVENOUS
Status: DISCONTINUED | OUTPATIENT
Start: 2017-08-29 | End: 2017-08-29

## 2017-08-29 RX ORDER — METHYLERGONOVINE MALEATE 0.2 MG/ML
200 INJECTION INTRAVENOUS
Status: DISCONTINUED | OUTPATIENT
Start: 2017-08-29 | End: 2017-08-29

## 2017-08-29 RX ORDER — CARBOPROST TROMETHAMINE 250 UG/ML
250 INJECTION, SOLUTION INTRAMUSCULAR
Status: DISCONTINUED | OUTPATIENT
Start: 2017-08-29 | End: 2017-08-29

## 2017-08-29 RX ORDER — OXYTOCIN/RINGER'S LACTATE 20/1000 ML
41.65 PLASTIC BAG, INJECTION (ML) INTRAVENOUS CONTINUOUS
Status: DISPENSED | OUTPATIENT
Start: 2017-08-29 | End: 2017-08-30

## 2017-08-29 RX ADMIN — FENTANYL CITRATE 100 MCG: 50 INJECTION, SOLUTION INTRAMUSCULAR; INTRAVENOUS at 01:08

## 2017-08-29 RX ADMIN — SODIUM CHLORIDE, SODIUM LACTATE, POTASSIUM CHLORIDE, AND CALCIUM CHLORIDE: .6; .31; .03; .02 INJECTION, SOLUTION INTRAVENOUS at 05:08

## 2017-08-29 RX ADMIN — Medication 5 ML: at 04:08

## 2017-08-29 RX ADMIN — MISOPROSTOL 800 MCG: 200 TABLET ORAL at 07:08

## 2017-08-29 RX ADMIN — BUPIVACAINE HYDROCHLORIDE 5 ML: 2.5 INJECTION, SOLUTION EPIDURAL; INFILTRATION; INTRACAUDAL; PERINEURAL at 01:08

## 2017-08-29 RX ADMIN — Medication 10 ML/HR: at 06:08

## 2017-08-29 RX ADMIN — FENTANYL CITRATE 100 MCG: 50 INJECTION, SOLUTION INTRAMUSCULAR; INTRAVENOUS at 06:08

## 2017-08-29 RX ADMIN — MISOPROSTOL 200 MCG: 200 TABLET ORAL at 09:08

## 2017-08-29 RX ADMIN — AMPICILLIN SODIUM 2 G: 2 INJECTION, POWDER, FOR SOLUTION INTRAMUSCULAR; INTRAVENOUS at 07:08

## 2017-08-29 RX ADMIN — GENTAMICIN SULFATE 288 MG: 40 INJECTION, SOLUTION INTRAMUSCULAR; INTRAVENOUS at 08:08

## 2017-08-29 RX ADMIN — ONDANSETRON 4 MG: 2 INJECTION INTRAMUSCULAR; INTRAVENOUS at 08:08

## 2017-08-29 RX ADMIN — CARBOPROST TROMETHAMINE 250 MCG: 250 INJECTION, SOLUTION INTRAMUSCULAR at 07:08

## 2017-08-29 RX ADMIN — Medication 2 MILLI-UNITS/MIN: at 03:08

## 2017-08-29 RX ADMIN — Medication 333 MILLI-UNITS/MIN: at 07:08

## 2017-08-29 RX ADMIN — DIPHENOXYLATE HYDROCHLORIDE AND ATROPINE SULFATE 1 TABLET: 2.5; .025 TABLET ORAL at 08:08

## 2017-08-29 RX ADMIN — LIDOCAINE HYDROCHLORIDE,EPINEPHRINE BITARTRATE 3 ML: 15; .005 INJECTION, SOLUTION EPIDURAL; INFILTRATION; INTRACAUDAL; PERINEURAL at 05:08

## 2017-08-29 RX ADMIN — FENTANYL CITRATE 100 MCG: 50 INJECTION, SOLUTION INTRAMUSCULAR; INTRAVENOUS at 10:08

## 2017-08-29 RX ADMIN — BUPIVACAINE HYDROCHLORIDE 6 ML: 2.5 INJECTION, SOLUTION EPIDURAL; INFILTRATION; INTRACAUDAL; PERINEURAL at 04:08

## 2017-08-29 RX ADMIN — Medication 41.65 MILLI-UNITS/MIN: at 09:08

## 2017-08-29 RX ADMIN — BUPIVACAINE HYDROCHLORIDE 6 ML: 2.5 INJECTION, SOLUTION EPIDURAL; INFILTRATION; INTRACAUDAL; PERINEURAL at 10:08

## 2017-08-29 RX ADMIN — Medication 10 ML: at 06:08

## 2017-08-29 RX ADMIN — ACETAMINOPHEN 1000 MG: 500 TABLET ORAL at 08:08

## 2017-08-29 RX ADMIN — Medication 5 ML: at 01:08

## 2017-08-29 NOTE — HOSPITAL COURSE
2017 - admitted for PROM. History of HSV, neg SSE.  2017 - PPD#1 s/p  complicated by atony, s/p cytotec, hemabate, and on cytotec series. Doing well this morning. Right labial swelling noted, not bothering the patient. Also complains of dizziness on standing but none while lying down. No other complaints.   2017 - PPD#2 s/p . Patient receiving 2u pRBCs. Ayoub in place. Dizzy when ambulating prior to receiving transfusion. Will monitor and reassess following transfusion. VSS. Meeting other postpartum goals (eating, no n/v, + flatus). Post transfusion H/H 9.8, doing well. VSS. Plan for discharge with precautions.

## 2017-08-29 NOTE — ED PROVIDER NOTES
Encounter Date: 2017       History     Chief Complaint   Patient presents with    Rupture of Membranes     Klarissa Krause is a 27 y.o. F at 39w2d presents complaining of LOF at 2200. She has been ta q5 min since that time. This IUP is complicated by HSV- on valtrex prophylaxis and denies prodromal symptoms.  Patient reports contractions, denies vaginal bleeding, reports LOF.   Fetal Movement: normal. Pt denies HA changes/CP/SOB. Denies lightheadedness/dizziness. Denies RUQ or epigastric pain.  She does report blurry vision over the past few hours, denies scotoma.       History obtained with assistance of [employer] .           Review of patient's allergies indicates:  No Known Allergies  Past Medical History:   Diagnosis Date    Abnormal Pap smear of cervix      No past surgical history on file.  Family History   Problem Relation Age of Onset    Cancer Maternal Grandmother     Stroke Maternal Grandfather     Diabetes Maternal Uncle     Colon cancer Neg Hx     Ovarian cancer Neg Hx      Social History   Substance Use Topics    Smoking status: Never Smoker    Smokeless tobacco: Never Used    Alcohol use No     Review of Systems   Constitutional: Negative for fever.   HENT: Negative for sore throat.    Eyes: Negative for visual disturbance.   Respiratory: Negative for shortness of breath.    Cardiovascular: Negative for chest pain.   Gastrointestinal: Positive for abdominal pain (ctx). Negative for nausea.   Genitourinary: Negative for dysuria.   Musculoskeletal: Negative for back pain.   Skin: Negative for rash.   Neurological: Negative for weakness and headaches.   Hematological: Does not bruise/bleed easily.       Physical Exam     Initial Vitals   BP Pulse Resp Temp SpO2   17 0110 17 0109 -- 17 0109 17 0110   (!) 134/92 75  98.1 °F (36.7 °C) 100 %      MAP       17 0110       106         Physical Exam    Vitals reviewed.  Constitutional: She appears  well-developed and well-nourished. She is not diaphoretic. No distress.   HENT:   Head: Normocephalic and atraumatic.   Cardiovascular: Normal rate, regular rhythm, normal heart sounds and intact distal pulses.   Pulmonary/Chest: No respiratory distress.   Abdominal: Soft. She exhibits no distension. There is no tenderness. There is no rebound and no guarding.   Neurological: She is alert and oriented to person, place, and time. She has normal strength. No sensory deficit.   Skin: Skin is warm and dry.   Psychiatric: She has a normal mood and affect. Her behavior is normal. Judgment and thought content normal.     OB LABOR EXAM:     Membranes ruptured: Yes.               Amniotic Fluid Color: clear.   Amniotic Fluid Amount: copious.   Comments: SSE consistent with +ROM. No lesions/ulcers noted on external genitalia, vagina, or cervix.       ED Course   Obtain Fetal nonstress test (NST)  Date/Time: 8/29/2017 1:56 AM  Performed by: JUNIE HENRY  Authorized by: JUNIE HENRY     A time out verifies correct patient, procedure, equipment, support staff and site/side marked as required:   Nonstress Test:     Variability:  6-25 BPM    Decelerations:  None    Accelerations:  15 bpm    Acoustic Stimulator: No      Baseline:  130    Contractions:  Irregular    Contraction Frequency:  1-4 min  Biophysical Profile:     Nonstress Test Interpretation: reactive      Overall Impression:  Reassuring  Post-procedure:     Patient tolerance:  Patient tolerated the procedure well with no immediate complications        Labs Reviewed - No data to display                APC / Resident Notes:   To L&D  BPs noted to be mild range- will obtain preE labs on admit.         Attending Attestation:   Physician Attestation Statement for Resident:  As the supervising MD   Physician Attestation Statement: I have personally seen and examined this patient.   I agree with the above history. -:   As the supervising MD I agree with the  above PE.    As the supervising MD I agree with the above treatment, course, plan, and disposition.   -: Patient evaluated and found to be stable, agree with resident's assessment and plan.  I was personally present during the critical portions of the procedure(s) performed by the resident and was immediately available in the ED to provide services and assistance as needed during the entire procedure.  I have reviewed and agree with the residents interpretation of the following: rhythm strips.  I have reviewed the following: old records at this facility.                    ED Course     Clinical Impression:   The primary encounter diagnosis was 39 weeks gestation of pregnancy. A diagnosis of PROM (premature rupture of membranes) was also pertinent to this visit.    Disposition:   Disposition: Admitted                        Subha Ellis MD  Resident  08/29/17 0158       Leti Sanders MD  09/03/17 1143

## 2017-08-29 NOTE — PROGRESS NOTES
"LABOR NOTE    S:  Complaints: No.  Patient is comfortable with epidural in place. Patient denies headache, scotoma, RUQ pain, N/V, CP, SOB.    O: BP (!) 141/90   Pulse 84   Temp 97.3 °F (36.3 °C)   Resp 18   Ht 5' 2.99" (1.6 m)   Wt 65.8 kg (145 lb)   LMP 2016   SpO2 100%   Breastfeeding? No   BMI 25.69 kg/m²       FHT: 140bpm Cat 1 (reassuring), mod beat to beat variability, + accelerations, no decelerations  CTX: q 2-3 minutes, pitocin at 12mU/min  SVE: /-1      ASSESSMENT:   27 y.o.  at 39w2d, PROM, PreE w/o SF      PLAN:    1. Labor Management   -Continue Close Maternal/Fetal Monitoring   - Maternal VSSAF   - FHT currently cat 1   - Augmentation- pitocin augmentation per protocol   - Repeat cervical exam in 2h    2. PreE w/o SF      -BP: (110-142)/(68-96) 141/90   - PreE labs wnl with the exception of PC ratio of 1.6   - Will start mag if severe sx develop   - Neg PreE ROS currently                "

## 2017-08-29 NOTE — SUBJECTIVE & OBJECTIVE
Obstetric History       T0      L0     SAB0   TAB0   Ectopic0   Multiple0   Live Births0       # Outcome Date GA Lbr Vlad/2nd Weight Sex Delivery Anes PTL Lv   1 Current                 Past Medical History:   Diagnosis Date    Abnormal Pap smear of cervix      No past surgical history on file.    PTA Medications   Medication Sig    PRENATAL 19 29 mg iron- 1 mg Chew TK 1 T PO QD    valacyclovir (VALTREX) 500 MG tablet Take 1 tablet (500 mg total) by mouth 2 (two) times daily.       Review of patient's allergies indicates:  No Known Allergies     Family History     Problem Relation (Age of Onset)    Cancer Maternal Grandmother    Diabetes Maternal Uncle    Stroke Maternal Grandfather        Social History Main Topics    Smoking status: Never Smoker    Smokeless tobacco: Never Used    Alcohol use No    Drug use: No    Sexual activity: Yes     Birth control/ protection: None     Review of Systems   Constitutional: Negative for chills and fever.   Eyes: Positive for visual disturbance.   Respiratory: Negative for shortness of breath.    Cardiovascular: Negative for chest pain and leg swelling.   Gastrointestinal: Positive for abdominal pain. Negative for nausea and vomiting.   Genitourinary: Positive for vaginal discharge (fluid). Negative for dysuria and vaginal bleeding.   Neurological: Negative for headaches.      Objective:     Vital Signs (Most Recent):  Temp: 98.1 °F (36.7 °C) (17 0109)  Pulse: 69 (17 0141)  BP: 127/82 (17 0141)  SpO2: 99 % (17 0140) Vital Signs (24h Range):  Temp:  [98.1 °F (36.7 °C)] 98.1 °F (36.7 °C)  Pulse:  [69-81] 69  SpO2:  [99 %-100 %] 99 %  BP: (127-142)/(82-96) 127/82     Weight: 65.8 kg (145 lb)  Body mass index is 25.69 kg/m².    FHT: 140 Cat 1 (reassuring)  TOCO:  Q 1-4 minutes    Physical Exam:   Constitutional: She is oriented to person, place, and time. She appears well-developed and well-nourished. No distress.       Cardiovascular:  Normal rate and regular rhythm.     Pulmonary/Chest: Effort normal.        Abdominal: Soft. She exhibits no distension. There is no tenderness.   Palpable ctx             Musculoskeletal: She exhibits no edema.       Neurological: She is alert and oriented to person, place, and time.    Skin: Skin is warm and dry.    Psychiatric: She has a normal mood and affect.       Cervix:  Dilation:  2  Effacement:  75%  Station: -3  Presentation: Vertex     Significant Labs:  Lab Results   Component Value Date    GROUPTRH A POS 02/10/2017    HEPBSAG Negative 02/10/2017    STREPBCULT No Group B Streptococcus isolated 08/02/2017       I have personallly reviewed all pertinent lab results from the last 24 hours.

## 2017-08-29 NOTE — PROGRESS NOTES
"LABOR NOTE    S:  Complaints: No.  Patient is comfortable with epidural in place. Patient denies headache, scotoma, RUQ pain, N/V, CP, SOB.    O: /89   Pulse 81   Temp 97 °F (36.1 °C) (Temporal)   Resp 18   Ht 5' 2.99" (1.6 m)   Wt 65.8 kg (145 lb)   LMP 2016   SpO2 99%   Breastfeeding? No   BMI 25.69 kg/m²       FHT: 140bpm Cat 1 (reassuring), mod beat to beat variability, + accelerations, no decelerations  CTX: q 2-3 minutes, pitocin at 12mU/min  SVE: ant lip/0      ASSESSMENT:   27 y.o.  at 39w2d, PROM, PreE w/o SF      PLAN:    1. Labor Management   -Continue Close Maternal/Fetal Monitoring   - Maternal VSSAF   - FHT currently cat 1   - Augmentation- pitocin augmentation per protocol   - Repeat cervical exam in 1h    2. PreE w/o SF        - BP: (110-154)/(68-96) 139/89   - PreE labs wnl with the exception of PC ratio of 1.6   - Will start mag if severe sx develop   - Neg PreE ROS currently                "

## 2017-08-29 NOTE — H&P
[facility]  Obstetrics  History & Physical    Patient Name: Klarissa Krause  MRN: [id number]  Admission Date: 2017  Primary Care Provider: Jhony SORIANO Age, APRN    Subjective:     Principal Problem:PROM (premature rupture of membranes)    History of Present Illness:  Klarissa Krause is a 27 y.o. F at 39w2d presents complaining of LOF at 2200. She has been ta q5 min since that time. This IUP is complicated by HSV- on valtrex prophylaxis and denies prodromal symptoms.  Patient reports contractions, denies vaginal bleeding, reports LOF.   Fetal Movement: normal.  Pt denies HA changes/CP/SOB. Denies lightheadedness/dizziness. Denies RUQ or epigastric pain.  She does report blurry vision over the past few hours, denies scotoma.        History obtained with assistance of [employer] .       Obstetric History       T0      L0     SAB0   TAB0   Ectopic0   Multiple0   Live Births0       # Outcome Date GA Lbr Vlad/2nd Weight Sex Delivery Anes PTL Lv   1 Current                 Past Medical History:   Diagnosis Date    Abnormal Pap smear of cervix      No past surgical history on file.    PTA Medications   Medication Sig    PRENATAL 19 29 mg iron- 1 mg Chew TK 1 T PO QD    valacyclovir (VALTREX) 500 MG tablet Take 1 tablet (500 mg total) by mouth 2 (two) times daily.       Review of patient's allergies indicates:  No Known Allergies     Family History     Problem Relation (Age of Onset)    Cancer Maternal Grandmother    Diabetes Maternal Uncle    Stroke Maternal Grandfather        Social History Main Topics    Smoking status: Never Smoker    Smokeless tobacco: Never Used    Alcohol use No    Drug use: No    Sexual activity: Yes     Birth control/ protection: None     Review of Systems   Constitutional: Negative for chills and fever.   Eyes: Positive for visual disturbance.   Respiratory: Negative for shortness of breath.    Cardiovascular: Negative for chest pain and leg  swelling.   Gastrointestinal: Positive for abdominal pain. Negative for nausea and vomiting.   Genitourinary: Positive for vaginal discharge (fluid). Negative for dysuria and vaginal bleeding.   Neurological: Negative for headaches.      Objective:     Vital Signs (Most Recent):  Temp: 98.1 °F (36.7 °C) (17 0109)  Pulse: 69 (17 0141)  BP: 127/82 (17 0141)  SpO2: 99 % (17 0140) Vital Signs (24h Range):  Temp:  [98.1 °F (36.7 °C)] 98.1 °F (36.7 °C)  Pulse:  [69-81] 69  SpO2:  [99 %-100 %] 99 %  BP: (127-142)/(82-96) 127/82     Weight: 65.8 kg (145 lb)  Body mass index is 25.69 kg/m².    FHT: 140 Cat 1 (reassuring)  TOCO:  Q 1-4 minutes    Physical Exam:   Constitutional: She is oriented to person, place, and time. She appears well-developed and well-nourished. No distress.       Cardiovascular: Normal rate and regular rhythm.     Pulmonary/Chest: Effort normal.        Abdominal: Soft. She exhibits no distension. There is no tenderness.   Palpable ctx             Musculoskeletal: She exhibits no edema.       Neurological: She is alert and oriented to person, place, and time.    Skin: Skin is warm and dry.    Psychiatric: She has a normal mood and affect.       Cervix:  Dilation:  2  Effacement:  75%  Station: -3  Presentation: Vertex     Significant Labs:  Lab Results   Component Value Date    GROUPTRH A POS 02/10/2017    HEPBSAG Negative 02/10/2017    STREPBCULT No Group B Streptococcus isolated 2017       I have personallly reviewed all pertinent lab results from the last 24 hours.    Assessment/Plan:     27 y.o. female  at 39w2d for:    Elevated BP without diagnosis of hypertension    - BP: (127-142)/(82-96) 127/82  - Pre-eclampsia labs pending  - Will continue to monitor          HSV (herpes simplex virus) infection    - Has been taking Valtrex  - Denies prodromal sx  - No lesions noted on SSE once fluid cleared        * PROM (premature rupture of membranes)    - Consents signed  and to chart (consented with assistance of [employer] )  - Admit to Labor and Delivery unit  - Plan for pitocin if no cervical change   - Draw CBC, T&S  - Notify Staff  - Ultrasound performed, fetus in cephalic position.  - Recheck in 2 hrs or PRN  - Post-Partum Hemorrhage risk - low            Subha Mata MD  Obstetrics  [facility]

## 2017-08-29 NOTE — ANESTHESIA PREPROCEDURE EVALUATION
2017  Klarissa Krause is a 27 y.o., female F at 39w2d presents with PROM.This IUP is complicated by HSV- on valtrex prophylaxis and she denies prodromal symptoms and no active lesions seen on SSE. Pt also found to be hypertensive and is being worked up for pre-eclampsia. She states that she would like to go natural and does not want an epidural.      OB History      Para Term  AB Living    1              SAB TAB Ectopic Multiple Live Births                         Patient Active Problem List   Diagnosis    Encounter for supervision of normal first pregnancy in third trimester    LGSIL on Pap smear of cervix - colpo mild.  repeat pap pp    HSV (herpes simplex virus) infection    PROM (premature rupture of membranes)    Elevated BP without diagnosis of hypertension       Review of patient's allergies indicates:  No Known Allergies     No current facility-administered medications on file prior to encounter.      Current Outpatient Prescriptions on File Prior to Encounter   Medication Sig Dispense Refill    PRENATAL 19 29 mg iron- 1 mg Chew TK 1 T PO QD  2    valacyclovir (VALTREX) 500 MG tablet Take 1 tablet (500 mg total) by mouth 2 (two) times daily. 60 tablet 1       No past surgical history on file.    Social History     Social History    Marital status: Single     Spouse name: N/A    Number of children: N/A    Years of education: N/A     Occupational History    Not on file.     Social History Main Topics    Smoking status: Never Smoker    Smokeless tobacco: Never Used    Alcohol use No    Drug use: No    Sexual activity: Yes     Birth control/ protection: None     Other Topics Concern    Not on file     Social History Narrative    No narrative on file         Vital Signs Range (Last 24H):  Temp:  [36.7 °C (98.1 °F)]   Pulse:  [69-81]   BP: (127-142)/(82-96)   SpO2:  [99  %-100 %]       CBC:   Recent Labs      08/29/17   0200   WBC  9.22   RBC  3.84*   HGB  9.7*   HCT  31.0*   PLT  276   MCV  81*   MCH  25.3*   MCHC  31.3*       CMP: No results for input(s): NA, K, CL, CO2, BUN, CREATININE, GLU, MG, PHOS, CALCIUM, ALBUMIN, PROT, ALKPHOS, ALT, AST, BILITOT in the last 72 hours.    INR  No results for input(s): INR, PROTIME, APTT in the last 72 hours.    Invalid input(s): PT        Anesthesia Evaluation    I have reviewed the Patient Summary Reports.    I have reviewed the Nursing Notes.   I have reviewed the Medications.     Review of Systems  Anesthesia Hx:  No previous Anesthesia  Neg history of prior surgery. Denies Family Hx of Anesthesia complications.   Denies Personal Hx of Anesthesia complications.   Hematology/Oncology:  Hematology Normal   Oncology Normal     EENT/Dental:EENT/Dental Normal   Cardiovascular:  Cardiovascular Normal     Pulmonary:  Pulmonary Normal    Renal/:  Renal/ Normal     Hepatic/GI:  Hepatic/GI Normal    Musculoskeletal:  Musculoskeletal Normal    Neurological:  Neurology Normal    Endocrine:  Endocrine Normal    Dermatological:  Skin Normal    Psych:  Psychiatric Normal           Physical Exam  General:  Well nourished    Airway/Jaw/Neck:  Airway Findings: Mouth Opening: Normal Tongue: Normal  General Airway Assessment: Adult  Mallampati: II  TM Distance: Normal, at least 6 cm      Dental:  Dental Findings: In tact   Chest/Lungs:  Chest/Lungs Findings: Clear to auscultation, Normal Respiratory Rate     Heart/Vascular:  Heart Findings: Rate: Normal  Rhythm: Regular Rhythm  Sounds: Normal        Mental Status:  Mental Status Findings:  Cooperative, Alert and Oriented         Anesthesia Plan  Type of Anesthesia, risks & benefits discussed:  Anesthesia Type:  CSE, epidural, general, spinal  Patient's Preference:   Intra-op Monitoring Plan:   Intra-op Monitoring Plan Comments:   Post Op Pain Control Plan:   Post Op Pain Control Plan Comments:    Induction:   IV  Beta Blocker:  Patient is not currently on a Beta-Blocker (No further documentation required).       Informed Consent: Patient understands risks and agrees with Anesthesia plan.  Questions answered. Anesthesia consent signed with patient.  ASA Score: 2     Day of Surgery Review of History & Physical:     H&P completed by Anesthesiologist.       Ready For Surgery From Anesthesia Perspective.

## 2017-08-29 NOTE — HPI
Klarissa Krause is a 27 y.o. F at 39w2d presents complaining of LOF at 2200. She has been ta q5 min since that time. This IUP is complicated by HSV- on valtrex prophylaxis and denies prodromal symptoms.  Patient reports contractions, denies vaginal bleeding, reports LOF.   Fetal Movement: normal.  Pt denies HA changes/CP/SOB. Denies lightheadedness/dizziness. Denies RUQ or epigastric pain.  She does report blurry vision over the past few hours, denies scotoma.        History obtained with assistance of [employer] .

## 2017-08-29 NOTE — PLAN OF CARE
Problem: Labor (Cervical Ripen, Induct, Augment) (Adult,Obstetrics,Pediatric)  Goal: Signs and Symptoms of Listed Potential Problems Will be Absent, Minimized or Managed (Labor)  Signs and symptoms of listed potential problems will be absent, minimized or managed by discharge/transition of care (reference Labor (Cervical Ripen, Induct, Augment) (Adult,Obstetrics,Pediatric) C[facility]).   Pt resting with epidural. Cervical change noted on exam. Clear amniotic fluid leaking. Pitocin infusing without difficulty. Will monitor.

## 2017-08-29 NOTE — PROGRESS NOTES
"LABOR NOTE    S:  Complaints: No.  Patient is comfortable with epidural in place. Patient denies headache, scotoma, RUQ pain, N/V, CP, SOB.    O: /83   Pulse 74   Temp 97.3 °F (36.3 °C)   Resp 18   Ht 5' 2.99" (1.6 m)   Wt 65.8 kg (145 lb)   LMP 2016   SpO2 99%   Breastfeeding? No   BMI 25.69 kg/m²       FHT: 150bpm Cat 1 (reassuring), mod beat to beat variability, + accelerations, no decelerations  CTX: q 2-3 minutes, pitocin at 4mU/min  SVE: 3/80/-2      ASSESSMENT:   27 y.o.  at 39w2d, PROM, PreE w/o SF      PLAN:    1. Labor Management   -Continue Close Maternal/Fetal Monitoring   - Maternal VSSAF   - FHT currently cat 1   - Augmentation- pitocin augmentation per protocol   - Repeat cervical exam in 2h    2. PreE w/o SF   - BP: (116-142)/(72-96) 128/83   - PreE labs wnl with the exception of PC ratio of 1.6   - Will start mag if severe sx develop   - Neg PreE ROS currently    Ina Hyatt M.D.  PGY-3 OB/GYN  [phone]        "

## 2017-08-29 NOTE — ANESTHESIA PROCEDURE NOTES
Epidural    Patient location during procedure: OB   Reason for block: primary anesthetic   Diagnosis: Active Labor   Start time: 8/29/2017 5:50 AM  Timeout: 8/29/2017 5:50 AM  End time: 8/29/2017 5:56 AM  Surgery related to: Vaginal Delivery  Staffing  Anesthesiologist: MOHIT LAZARO  Resident/CRNA: DILCIA VICKERS  Performed: resident/CRNA   Preanesthetic Checklist  Completed: patient identified, site marked, surgical consent, pre-op evaluation, timeout performed, IV checked, risks and benefits discussed, monitors and equipment checked, anesthesia consent given, hand hygiene performed and patient being monitored  Preparation  Patient position: sitting  Prep: ChloraPrep  Patient monitoring: Pulse Ox and Blood Pressure  Epidural  Skin Anesthetic: lidocaine 1%  Skin Wheal: 3 mL  Administration type: continuous  Approach: midline  Interspace: L3-4  Injection technique: SYLVIA saline  Needle and Epidural Catheter  Needle type: Tuohy   Needle gauge: 17  Needle length: 3.5 inches  Needle insertion depth: 6 cm  Catheter type: springwound and multi-orifice  Catheter size: 19 G  Catheter at skin depth: 10 cm  Test dose: 3 mL of lidocaine 1.5% with Epi 1-to-200,000  Additional Documentation: incremental injection, negative aspiration for heme and CSF, no paresthesia on injection, no signs/symptoms of IV or SA injection, no significant pain on injection and no significant complaints from patient  Needle localization: anatomical landmarks  Medications:  Bolus administered: 10 mL of 0.125% bupivacaine  Opioid administered: 100 mcg of   fentanyl  Volume per aspiration: 5 mL  Time between aspirations: 5 minutes  Assessment  Ease of block: easy  Patient's tolerance of the procedure: comfortable throughout block and no complaints  Post dural Puncture Headache?: No

## 2017-08-29 NOTE — ASSESSMENT & PLAN NOTE
- Consents signed and to chart (consented with assistance of Naomi )  - Admit to Labor and Delivery unit  - Plan for pitocin if no cervical change   - Draw CBC, T&S  - Notify Staff  - Ultrasound performed, fetus in cephalic position.  - Recheck in 2 hrs or PRN  - Post-Partum Hemorrhage risk - low

## 2017-08-29 NOTE — PROGRESS NOTES
"LABOR NOTE    S:  Complaints: No.  Patient is comfortable with epidural in place. Patient denies headache, scotoma, RUQ pain, N/V, CP, SOB.    O: /82   Pulse 73   Temp 97 °F (36.1 °C) (Temporal)   Resp 20   Ht 5' 2.99" (1.6 m)   Wt 65.8 kg (145 lb)   LMP 2016   SpO2 99%   Breastfeeding? No   BMI 25.69 kg/m²       FHT: 140bpm Cat 1 (reassuring), mod beat to beat variability, + accelerations, no decelerations  CTX: q 2-3 minutes, pitocin at 12mU/min  SVE: /-1      ASSESSMENT:   27 y.o.  at 39w2d, PROM, PreE w/o SF      PLAN:    1. Labor Management   -Continue Close Maternal/Fetal Monitoring   - Maternal VSSAF   - FHT currently cat 1   - Augmentation- pitocin augmentation per protocol   - Repeat cervical exam in 2h    2. PreE w/o SF   - BP: (110-142)/(68-96) 127/82   - PreE labs wnl with the exception of PC ratio of 1.6   - Will start mag if severe sx develop   - Neg PreE ROS currently      Yohan Varner MD  PGY-2 OB/GYN  [phone]          "

## 2017-08-29 NOTE — PROGRESS NOTES
"LABOR NOTE    S:  Complaints: No.  Patient is comfortable with epidural in place. Patient denies headache, scotoma, RUQ pain, N/V, CP, SOB.    O: /82   Pulse 73   Temp 97 °F (36.1 °C) (Temporal)   Resp 20   Ht 5' 2.99" (1.6 m)   Wt 65.8 kg (145 lb)   LMP 2016   SpO2 99%   Breastfeeding? No   BMI 25.69 kg/m²       FHT: 140bpm Cat 1 (reassuring), mod beat to beat variability, + accelerations, no decelerations  CTX: q 2-3 minutes  SVE: 490/-1, IUPC placed      ASSESSMENT:   27 y.o.  at 39w2d, PROM, PreE w/o SF      PLAN:    1. Labor Management   -Continue Close Maternal/Fetal Monitoring   - Maternal VSSAF   - FHT currently cat 1   - Augmentation- pitocin augmentation per protocol   - Repeat cervical exam in 2h   - IUPC in place    2. PreE w/o SF   - BP: (110-142)/(68-96) 127/82   - PreE labs wnl with the exception of PC ratio of 1.6   - Will start mag if severe sx develop   - Neg PreE ROS currently          "

## 2017-08-30 LAB
BASOPHILS # BLD AUTO: ABNORMAL K/UL
BASOPHILS # BLD AUTO: ABNORMAL K/UL
BASOPHILS NFR BLD: 0 %
BASOPHILS NFR BLD: 0 %
DIFFERENTIAL METHOD: ABNORMAL
DIFFERENTIAL METHOD: ABNORMAL
EOSINOPHIL # BLD AUTO: ABNORMAL K/UL
EOSINOPHIL # BLD AUTO: ABNORMAL K/UL
EOSINOPHIL NFR BLD: 0 %
EOSINOPHIL NFR BLD: 0 %
ERYTHROCYTE [DISTWIDTH] IN BLOOD BY AUTOMATED COUNT: 15.9 %
ERYTHROCYTE [DISTWIDTH] IN BLOOD BY AUTOMATED COUNT: 16.2 %
GIANT PLATELETS BLD QL SMEAR: PRESENT
GIANT PLATELETS BLD QL SMEAR: PRESENT
HCT VFR BLD AUTO: 21.2 %
HCT VFR BLD AUTO: 22.5 %
HGB BLD-MCNC: 6.7 G/DL
HGB BLD-MCNC: 7.1 G/DL
LYMPHOCYTES # BLD AUTO: ABNORMAL K/UL
LYMPHOCYTES # BLD AUTO: ABNORMAL K/UL
LYMPHOCYTES NFR BLD: 14 %
LYMPHOCYTES NFR BLD: 3 %
MCH RBC QN AUTO: 25.1 PG
MCH RBC QN AUTO: 25.3 PG
MCHC RBC AUTO-ENTMCNC: 31.6 G/DL
MCHC RBC AUTO-ENTMCNC: 31.6 G/DL
MCV RBC AUTO: 79 FL
MCV RBC AUTO: 80 FL
MONOCYTES # BLD AUTO: ABNORMAL K/UL
MONOCYTES # BLD AUTO: ABNORMAL K/UL
MONOCYTES NFR BLD: 3 %
MONOCYTES NFR BLD: 4 %
NEUTROPHILS NFR BLD: 82 %
NEUTROPHILS NFR BLD: 83 %
NEUTS BAND NFR BLD MANUAL: 11 %
PLATELET # BLD AUTO: 206 K/UL
PLATELET # BLD AUTO: 228 K/UL
PLATELET BLD QL SMEAR: ABNORMAL
PLATELET BLD QL SMEAR: ABNORMAL
PMV BLD AUTO: 10.3 FL
PMV BLD AUTO: 11.1 FL
POLYCHROMASIA BLD QL SMEAR: ABNORMAL
RBC # BLD AUTO: 2.67 M/UL
RBC # BLD AUTO: 2.81 M/UL
WBC # BLD AUTO: 12.44 K/UL
WBC # BLD AUTO: 18.18 K/UL

## 2017-08-30 PROCEDURE — 72100003 HC LABOR CARE, EA. ADDL. 8 HRS

## 2017-08-30 PROCEDURE — 85007 BL SMEAR W/DIFF WBC COUNT: CPT | Mod: 91

## 2017-08-30 PROCEDURE — 72200005 HC VAGINAL DELIVERY LEVEL II

## 2017-08-30 PROCEDURE — 25000003 PHARM REV CODE 250: Performed by: OBSTETRICS & GYNECOLOGY

## 2017-08-30 PROCEDURE — 85027 COMPLETE CBC AUTOMATED: CPT

## 2017-08-30 PROCEDURE — 36415 COLL VENOUS BLD VENIPUNCTURE: CPT

## 2017-08-30 PROCEDURE — 27000181 HC CABLE, IUPC

## 2017-08-30 PROCEDURE — 51702 INSERT TEMP BLADDER CATH: CPT

## 2017-08-30 PROCEDURE — 25000003 PHARM REV CODE 250: Performed by: STUDENT IN AN ORGANIZED HEALTH CARE EDUCATION/TRAINING PROGRAM

## 2017-08-30 PROCEDURE — 11000001 HC ACUTE MED/SURG PRIVATE ROOM

## 2017-08-30 PROCEDURE — 99232 SBSQ HOSP IP/OBS MODERATE 35: CPT | Mod: ,,, | Performed by: OBSTETRICS & GYNECOLOGY

## 2017-08-30 PROCEDURE — 63600175 PHARM REV CODE 636 W HCPCS: Performed by: STUDENT IN AN ORGANIZED HEALTH CARE EDUCATION/TRAINING PROGRAM

## 2017-08-30 RX ORDER — HYDROCODONE BITARTRATE AND ACETAMINOPHEN 500; 5 MG/1; MG/1
TABLET ORAL
Status: DISCONTINUED | OUTPATIENT
Start: 2017-08-31 | End: 2017-09-01 | Stop reason: HOSPADM

## 2017-08-30 RX ORDER — SODIUM CHLORIDE, SODIUM LACTATE, POTASSIUM CHLORIDE, CALCIUM CHLORIDE 600; 310; 30; 20 MG/100ML; MG/100ML; MG/100ML; MG/100ML
INJECTION, SOLUTION INTRAVENOUS CONTINUOUS
Status: DISCONTINUED | OUTPATIENT
Start: 2017-08-30 | End: 2017-09-01 | Stop reason: HOSPADM

## 2017-08-30 RX ADMIN — MISOPROSTOL 200 MCG: 200 TABLET ORAL at 04:08

## 2017-08-30 RX ADMIN — IBUPROFEN 600 MG: 600 TABLET, FILM COATED ORAL at 06:08

## 2017-08-30 RX ADMIN — HYDROCODONE BITARTRATE AND ACETAMINOPHEN 1 TABLET: 5; 325 TABLET ORAL at 07:08

## 2017-08-30 RX ADMIN — MISOPROSTOL 200 MCG: 200 TABLET ORAL at 06:08

## 2017-08-30 RX ADMIN — IBUPROFEN 600 MG: 600 TABLET, FILM COATED ORAL at 05:08

## 2017-08-30 RX ADMIN — MISOPROSTOL 200 MCG: 200 TABLET ORAL at 11:08

## 2017-08-30 RX ADMIN — IBUPROFEN 600 MG: 600 TABLET, FILM COATED ORAL at 11:08

## 2017-08-30 RX ADMIN — AMPICILLIN SODIUM 2 G: 2 INJECTION, POWDER, FOR SOLUTION INTRAMUSCULAR; INTRAVENOUS at 02:08

## 2017-08-30 RX ADMIN — IBUPROFEN 600 MG: 600 TABLET, FILM COATED ORAL at 12:08

## 2017-08-30 RX ADMIN — HYDROCODONE BITARTRATE AND ACETAMINOPHEN 1 TABLET: 5; 325 TABLET ORAL at 09:08

## 2017-08-30 NOTE — ASSESSMENT & PLAN NOTE
- BP: (127-142)/(82-96) 127/82  - Pre-eclampsia labs pending  - Will continue to monitor  - denies Pre-E symptoms

## 2017-08-30 NOTE — SUBJECTIVE & OBJECTIVE
Hospital course: 2017 - admitted for PROM. History of HSV, neg SSE.  2017 - PPD#1 s/p  complicated by atony, s/p cytotec, hemabate, and on cytotec series. Doing well this morning. Right labial swelling noted, not bothering the patient. Also complains of dizziness on standing but none while lying down. No other complaints.       Interval History:     She is doing well this morning. She is tolerating a regular diet without nausea or vomiting. She is not voiding spontaneously (morales in place). She is ambulating. She has passed flatus, and has not a BM. Vaginal bleeding is moderate. She denies fever or chills. Abdominal pain is mild and controlled with oral medications. She is breastfeeding. She desires circumcision for her male baby: not applicable. She has right labial swelling which is not painful when she takes her pain medications. She feels dizzy on standing but no dizziness lying down and no other symptoms. She denies headache.    Objective:     Vital Signs (Most Recent):  Temp: 98.5 °F (36.9 °C) (17 2345)  Pulse: 76 (17 2345)  Resp: 18 (17 2345)  BP: 123/75 (17 2345)  SpO2: 100 % (17 2257) Vital Signs (24h Range):  Temp:  [97 °F (36.1 °C)-100.8 °F (38.2 °C)] 98.5 °F (36.9 °C)  Pulse:  [] 76  Resp:  [18-20] 18  SpO2:  [95 %-100 %] 100 %  BP: (109-154)/(65-93) 123/75     Weight: 65.8 kg (145 lb)  Body mass index is 25.69 kg/m².      Intake/Output Summary (Last 24 hours) at 17 0640  Last data filed at 17 0530   Gross per 24 hour   Intake                0 ml   Output             2064 ml   Net            -2064 ml       Significant Labs:  Lab Results   Component Value Date    GROUPTRH A POS 2017    HEPBSAG Negative 02/10/2017    STREPBCULT No Group B Streptococcus isolated 2017       Recent Labs  Lab 17  0408   HGB 7.1*   HCT 22.5*       I have personallly reviewed all pertinent lab results from the last 24 hours.    Physical Exam:    Constitutional: She is oriented to person, place, and time. She appears well-developed and well-nourished.    HENT:   Head: Normocephalic and atraumatic.    Eyes: EOM are normal.    Neck: Normal range of motion.    Cardiovascular: Normal rate.     Pulmonary/Chest: Effort normal.        Abdominal: Soft. There is tenderness (mild, appropriate). There is no rebound and no guarding.   Postpartum     Genitourinary:   Genitourinary Comments: Mild amount of blood on pad.  Right labia is swollen, non-erythematous, no discharge or other lesions noted.  Ayoub in place.               Neurological: She is alert and oriented to person, place, and time.    Skin: Skin is warm and dry.    Psychiatric: She has a normal mood and affect. Her behavior is normal. Judgment and thought content normal.

## 2017-08-30 NOTE — PROGRESS NOTES
Pt complains of headache 5/10, blurry vision. VSS. Low urine output. Dr. Syed at bedside assessing pt. Ordered to start fluids @125ml/hr. Vicodin 5mg given for headache, will reassess in 1 hour.

## 2017-08-30 NOTE — LACTATION NOTE
LC reviewed pumping for her NICU baby. Gave mother NICU Blue folder for lactation. Showed mother how to work pump, how to keep track of pumpings, how to label nicu breastmilk, how to clean pump parts and bring milk to NICU even if it is only a drop of milk. NICU uses mother's milk for mouth care so even small amounts are ok to bring to NICU. Mother aware to pump 8 or more times a day. Showed mother how to use Symphony pump on premie setting. Call RN with any further questions.Mother may want to start thinking about what she will pump with when she gets home. Mother has WIC. JEB encouraged mother to call WIC and let them know she had a baby. Dad is in room helping mother pump. Left JEB number on white board.

## 2017-08-30 NOTE — DISCHARGE INSTRUCTIONS
Basic lactation discharge instructions for NICU baby    Pump at least 8-10 times daily for 15-20 minutes  Wash kit after every use by rinsing with cold water, wash with hot soapy water, rinse with cold water and air dry on paper towels.  Label each bottle with baby label . Write on label date, time and medications taken.  Refrigerate breastmilk if visiting baby daily. Transport breastmilk in an insulated lunch bag or small ice chest using gel packs to keep breastmilk cold and or frozen.  If you unable to visit baby daily , freeze breastmilk and bring to hospital frozen.    Bring pumping kit to hospital while visiting baby to pump at bedside and leave with nurses.  If you need more labels or containers ask nurse taking care of baby.    Questions Call lactation consultant: [phone]

## 2017-08-30 NOTE — PROGRESS NOTES
MD to bedside to re-evaluate patient given low AM CBC and right labial swelling. Patient was able to ambulate to bathroom with assistance earlier today without feeling lightheaded. Patient is doing well without complaints. Right labia continues to be swollen but is improving, no bruising or lesions/discharge evident.  Morales still in place with adequate UOP.     VSS  Temp:  [98 °F (36.7 °C)-99.2 °F (37.3 °C)] 98.9 °F (37.2 °C)  Pulse:  [63-88] 72  Resp:  [16-18] 18  SpO2:  [98 %-100 %] 100 %  BP: (133-177)/(66-93) 133/92     Physical exam:  Genitourinary: swollen right labia, not indurated, no lesions, bruising, or drainage noted. Minimal blood on the pad. Morales in place.     A/P:  - continue current management  - will reassess labial swelling this PM and decide on morales removal at that time.

## 2017-08-30 NOTE — L&D DELIVERY NOTE
cephalic after approximately 60 minutes of maternal pushing.  Under epidural anesthesia.  Infant delivered OA over intact perineum.  Nuchal x1 reduced at introitus.    This delivery was complicated by a shoulder dystocia    · After the head delivered, the left shoulder was noted to be anterior and held behind the pubic symphysis.  · Immediately at the identification of dystocia, I asked the nurse to note the time and call assistance to the room including additional residents, nursing staff and peditrics  ·  There was enough room for manuevers and an episiotomy was not required.  · Maneuvers Required to relieve the dystocia included: Bryant, Suprapubic pressure, and Woods Screw.   · Cord gases were sent  · NICU was in room at delivery  · APGARS were 3 at 1 minute and 6 at 5 minutes, and 8 at 10 minutes  · Birthweight 3730g  .  ·  was moving both arms without evidence of nerve injury at the time of delivery  · There was not evidence of clavicular fracture  · The Shoulder dystocia lasted a total of 30 seconds    Cord immediately clamped and cut and infant passed off to awaiting NICU staff.   Umbilical arterial gas and venous blood obtained.  Placenta delivered spontaneously and IV pitocin given.  Left sided second degree laceration repaired in running locking fashion and noted to be hemostatic. Deep tissue reapproximated with interrupted sutures. Skin reapproximated in running subcuticular fashion.   Uterine atony noted. Hemabate 0.25mcg IM and cytotec 800mcg PO administered with subsequent improvement.  No cervical lacerations.  Patient tolerated delivery well.   cc  S/L/N counts correct x2.    Subha Ellis MD  OB/GYN, PGY-2       Delivery Information for  Keshia Krause    Birth information:  YOB: 2017   Time of birth: 7:40 PM   Sex: female   Head Delivery Date/Time: 2017  7:40 PM   Delivery type: Vaginal, Spontaneous Delivery   Gestational Age: 39w2d    Delivery  Providers    Delivering clinician:  Karlee Yo MD   Other personnel:   Provider Role   Subha Ellis MD Resident   Milton Prado MD Resident   Sirisha Barrientos, RN Registered Nurse   Marian Durham, RN Registered Nurse   Cassidy Gao, RN Registered Nurse               [place] Measurements    Weight:  3730 g Length:  52.5 cm   Head circum.:  33.5 cm            Assessment    Living status:  Living  Apgars:     1 Minute:   5 Minute:   10 Minute:   15 Minute:   20 Minute:     Skin Color:   0  0  1      Heart Rate:   2  2  2      Reflex Irritability:   0  1  2      Muscle Tone:   0  1  1      Respiratory Effort:   1  2  2      Total:   3  6  8              Apgars Assigned By:  NICU         Assisted Delivery Details:    Forceps attempted?:  No  Vacuum extractor attempted?:  No         Shoulder Dystocia    Shoulder dystocia present?:  Yes  Anterior shoulder:  right  Time recognized:  2017  Time help called:  2017    NICU arrived:  2017    Gentle attempt at traction, assisted by maternal expulsive forces?:  Yes    First maneuver:  Bryant maneuver  Time performed:  2017  Performed by:  ZENIA Barrientos RN  Second maneuver:  suprapubic pressure  Time performed:  2017  Performed by:  DENEEN Durham RN           Presentation and Position    Presentation:   Vertex   Position:   Middle    Occiput            Interventions/Resuscitation    Method:  Bulb Suctioning, Tactile Stimulation, NICU Attended       Cord    Vessels:  3 vessels  Complications:  Nuchal  Nuchal Intervention:  reduced  Nuchal Cord Description:  loose nuchal cord  Number of Loops:  1  Delayed Cord Clamping?:  No  Cord Clamped Date/Time:  2017  7:40 PM  Cord Blood Disposition:  Sent with Baby  Gases Sent?:  Yes  Stem Cell Collection (by MD):  No       Placenta    Date and time:  2017  7:47 PM  Removal:  Spontaneous  Appearance:  Intact  Placenta disposition:  discarded            Labor Events:       labor: No     Labor Onset Date/Time:         Dilation Complete Date/Time:         Start Pushing Date/Time: 2017 18:35     Rupture Date/Time:              Rupture type:           Fluid Amount:        Fluid Color:        Fluid Odor:        Membrane Status (PeriCalm):        Rupture Date/Time (PeriCalm):        Fluid Amount (PeriCalm):        Fluid Color (PeriCalm):         steroids: None     Antibiotics given for GBS: No     Induction: oxytocin     Indications for induction:  Premature ROM     Augmentation:       Indications for augmentation:       Labor complications: Shoulder Dystocia;Chorioamnionitis     Additional complications:          Cervical ripening:                     Delivery:      Episiotomy: None     Indication for Episiotomy:       Perineal Lacerations: 2nd Repaired:  Yes   Periurethral Laceration: none Repaired:     Labial Laceration: none Repaired:     Sulcus Laceration: none Repaired:     Vaginal Laceration: No Repaired:     Cervical Laceration: No Repaired:     Repair suture:       Repair # of packets: 3     Vaginal delivery QBL (mL): 414      QBL (mL): 0     Combined Blood Loss (mL): 414     Vaginal Sweep Performed: Yes     Surgicount Correct: Yes       Other providers:       Anesthesia    Method:  Epidural          Details (if applicable):  Trial of Labor      Categorization:      Priority:     Indications for :     Incision Type:       Additional  information:  Forceps:    Vacuum:    Breech:    Observed anomalies    Other (Comments):

## 2017-08-30 NOTE — PROGRESS NOTES
[facility]  Obstetrics  Postpartum Progress Note    Patient Name: Klarissa Krause  MRN: [id number]  Admission Date: 2017  Hospital Length of Stay: 1 days  Attending Physician: Julia Polanco MD  Primary Care Provider: Jhony SORIANO Age, APRN    Subjective:     Principal Problem: (spontaneous vaginal delivery)    Hospital course: 2017 - admitted for PROM. History of HSV, neg SSE.  2017 - PPD#1 s/p  complicated by atony, s/p cytotec, hemabate, and on cytotec series. Doing well this morning. Right labial swelling noted, not bothering the patient. Also complains of dizziness on standing but none while lying down. No other complaints.       Interval History:     She is doing well this morning. She is tolerating a regular diet without nausea or vomiting. She is not voiding spontaneously (morales in place). She is ambulating. She has passed flatus, and has not a BM. Vaginal bleeding is moderate. She denies fever or chills. Abdominal pain is mild and controlled with oral medications. She is breastfeeding. She desires circumcision for her male baby: not applicable. She has right labial swelling which is not painful when she takes her pain medications. She feels dizzy on standing but no dizziness lying down and no other symptoms. She denies headache.    Objective:     Vital Signs (Most Recent):  Temp: 98.5 °F (36.9 °C) (17 2345)  Pulse: 76 (17 2345)  Resp: 18 (17 2345)  BP: 123/75 (17 2345)  SpO2: 100 % (17 2257) Vital Signs (24h Range):  Temp:  [97 °F (36.1 °C)-100.8 °F (38.2 °C)] 98.5 °F (36.9 °C)  Pulse:  [] 76  Resp:  [18-20] 18  SpO2:  [95 %-100 %] 100 %  BP: (109-154)/(65-93) 123/75     Weight: 65.8 kg (145 lb)  Body mass index is 25.69 kg/m².      Intake/Output Summary (Last 24 hours) at 17 0640  Last data filed at 17 0567   Gross per 24 hour   Intake                0 ml   Output             2064 ml   Net            -2064 ml       Significant  Labs:  Lab Results   Component Value Date    GROUPTRH A POS 2017    HEPBSAG Negative 02/10/2017    STREPBCULT No Group B Streptococcus isolated 2017       Recent Labs  Lab 17  0408   HGB 7.1*   HCT 22.5*       I have personallly reviewed all pertinent lab results from the last 24 hours.    Physical Exam:   Constitutional: She is oriented to person, place, and time. She appears well-developed and well-nourished.    HENT:   Head: Normocephalic and atraumatic.    Eyes: EOM are normal.    Neck: Normal range of motion.    Cardiovascular: Normal rate.     Pulmonary/Chest: Effort normal.        Abdominal: Soft. There is tenderness (mild, appropriate). There is no rebound and no guarding. Fundus firm below umbilicus.  Postpartum     Genitourinary:   Genitourinary Comments: Mild amount of blood on pad.  Right labia is swollen, non-erythematous, no discharge or other lesions noted.  Ayoub in place.               Neurological: She is alert and oriented to person, place, and time.    Skin: Skin is warm and dry.    Psychiatric: She has a normal mood and affect. Her behavior is normal. Judgment and thought content normal.       Assessment/Plan:     27 y.o. female  for:    Anemia, postpartum    - Pre Delivery h/h  --> Post Delivery h/h   - dizziness with standing  - nurse noted bleeding of 150cc. Currently not having large amount of active bleeding on exam, small amount of blood on pad.  - Continue to monitor         Elevated BP without diagnosis of hypertension    - BP: (127-142)/(82-96) 127/82  - Pre-eclampsia labs pending  - Will continue to monitor  - denies Pre-E symptoms        HSV (herpes simplex virus) infection    - Has been taking Valtrex  - Denies prodromal sx  - No lesions noted on SSE once fluid cleared        *  (spontaneous vaginal delivery)    Postpartum care:  - Patient doing well. Continue routine management and advances.  - Continue PO pain meds. Pain well controlled.  -  Encourage ambulation.   - Heme: Pre Delivery h/h 9/31 --> Post Delivery h/h 7/22  - Contraception - address at postpartum followup  - Lactation - The patient is breastfeeding. Lactation nurse following along PRN  - Rh Status - pos              Disposition: As patient meets milestones, will plan to discharge PPD#2.    Sushma K Reddy, MD  Obstetrics  [facility]

## 2017-08-30 NOTE — PROGRESS NOTES
Notified Dr. Ellis about pt having some dizziness when standing up out of the bed. Pt did have some bleeding while standing. Bleeding is moderate to heavy. Fundus firm. MD will reassess pt. Will continue to monitor and maintain pt safety.

## 2017-08-30 NOTE — ANESTHESIA POSTPROCEDURE EVALUATION
"Anesthesia Post Evaluation    Patient: Klarissa Krause    Procedure(s) Performed: * No procedures listed *    Final Anesthesia Type: epidural  Patient location during evaluation: labor & delivery  Patient participation: Yes- Able to Participate  Level of consciousness: awake and alert, oriented and awake  Post-procedure vital signs: reviewed and stable  Pain management: adequate  Airway patency: patent  PONV status at discharge: No PONV  Anesthetic complications: no      Cardiovascular status: blood pressure returned to baseline, stable and hemodynamically stable  Respiratory status: unassisted, spontaneous ventilation and room air  Hydration status: euvolemic  Follow-up not needed.        Visit Vitals  /70   Pulse 85   Temp 37.1 °C (98.7 °F) (Oral)   Resp 18   Ht 5' 2.99" (1.6 m)   Wt 65.8 kg (145 lb)   LMP 11/27/2016   SpO2 97%   Breastfeeding? Unknown   BMI 25.69 kg/m²       Pain/Gulshan Score: Pain Rating Prior to Med Admin: 5 (8/30/2017  9:29 AM)  Pain Rating Post Med Admin: 0 (8/30/2017  1:00 AM)      "

## 2017-08-30 NOTE — PROGRESS NOTES
Mother would like to pump for her baby. Mother encouraged to provide  breastmilk by pumping.Benefits of breastmilk discussed. Breastpump initiated. Mother educated on pump use, cleaning pump parts, labeling containers and storage of breastmilk. Mother to call her nurse with further questions. Translated through [employer].

## 2017-08-30 NOTE — ASSESSMENT & PLAN NOTE
Postpartum care:  - Patient doing well. Continue routine management and advances.  - Continue PO pain meds. Pain well controlled.  - Encourage ambulation.   - Heme: Pre Delivery h/h 9/31 --> Post Delivery h/h 7/22  - Contraception - address at postpartum followup  - Lactation - The patient is breastfeeding. Lactation nurse following along PRN  - Rh Status - pos

## 2017-08-30 NOTE — ASSESSMENT & PLAN NOTE
- Pre Delivery h/h 9/31 --> Post Delivery h/h 7/22  - dizziness with standing  - nurse noted bleeding of 150cc. Currently not having large amount of active bleeding on exam, small amount of blood on pad.  - Continue to monitor

## 2017-08-31 VITALS
WEIGHT: 145 LBS | TEMPERATURE: 98 F | SYSTOLIC BLOOD PRESSURE: 140 MMHG | HEART RATE: 61 BPM | RESPIRATION RATE: 18 BRPM | DIASTOLIC BLOOD PRESSURE: 89 MMHG | BODY MASS INDEX: 25.69 KG/M2 | HEIGHT: 63 IN | OXYGEN SATURATION: 96 %

## 2017-08-31 LAB
BASOPHILS # BLD AUTO: 0.02 K/UL
BASOPHILS NFR BLD: 0.2 %
BLD PROD TYP BPU: NORMAL
BLD PROD TYP BPU: NORMAL
BLOOD UNIT EXPIRATION DATE: NORMAL
BLOOD UNIT EXPIRATION DATE: NORMAL
BLOOD UNIT TYPE CODE: 6200
BLOOD UNIT TYPE CODE: 6200
BLOOD UNIT TYPE: NORMAL
BLOOD UNIT TYPE: NORMAL
CODING SYSTEM: NORMAL
CODING SYSTEM: NORMAL
DIFFERENTIAL METHOD: ABNORMAL
DISPENSE STATUS: NORMAL
DISPENSE STATUS: NORMAL
EOSINOPHIL # BLD AUTO: 0.1 K/UL
EOSINOPHIL NFR BLD: 0.4 %
ERYTHROCYTE [DISTWIDTH] IN BLOOD BY AUTOMATED COUNT: 15.9 %
HCT VFR BLD AUTO: 30.3 %
HGB BLD-MCNC: 9.8 G/DL
LYMPHOCYTES # BLD AUTO: 1.3 K/UL
LYMPHOCYTES NFR BLD: 11.2 %
MCH RBC QN AUTO: 26.2 PG
MCHC RBC AUTO-ENTMCNC: 32.3 G/DL
MCV RBC AUTO: 81 FL
MONOCYTES # BLD AUTO: 0.7 K/UL
MONOCYTES NFR BLD: 6 %
NEUTROPHILS # BLD AUTO: 9.5 K/UL
NEUTROPHILS NFR BLD: 81.9 %
NUM UNITS TRANS PACKED RBC: NORMAL
PLATELET # BLD AUTO: 215 K/UL
PMV BLD AUTO: 10.7 FL
RBC # BLD AUTO: 3.74 M/UL
TRANS ERYTHROCYTES VOL PATIENT: NORMAL ML
WBC # BLD AUTO: 11.54 K/UL

## 2017-08-31 PROCEDURE — 36415 COLL VENOUS BLD VENIPUNCTURE: CPT

## 2017-08-31 PROCEDURE — 25000003 PHARM REV CODE 250: Performed by: OBSTETRICS & GYNECOLOGY

## 2017-08-31 PROCEDURE — 85025 COMPLETE CBC W/AUTO DIFF WBC: CPT

## 2017-08-31 PROCEDURE — 99238 HOSP IP/OBS DSCHRG MGMT 30/<: CPT | Mod: ,,, | Performed by: OBSTETRICS & GYNECOLOGY

## 2017-08-31 PROCEDURE — 86920 COMPATIBILITY TEST SPIN: CPT

## 2017-08-31 PROCEDURE — P9016 RBC LEUKOCYTES REDUCED: HCPCS

## 2017-08-31 PROCEDURE — 11000001 HC ACUTE MED/SURG PRIVATE ROOM

## 2017-08-31 PROCEDURE — P9021 RED BLOOD CELLS UNIT: HCPCS

## 2017-08-31 PROCEDURE — 36430 TRANSFUSION BLD/BLD COMPNT: CPT

## 2017-08-31 PROCEDURE — 25000003 PHARM REV CODE 250: Performed by: STUDENT IN AN ORGANIZED HEALTH CARE EDUCATION/TRAINING PROGRAM

## 2017-08-31 RX ORDER — ACETAMINOPHEN 325 MG/1
650 TABLET ORAL ONCE
Status: COMPLETED | OUTPATIENT
Start: 2017-08-31 | End: 2017-08-31

## 2017-08-31 RX ORDER — IBUPROFEN 600 MG/1
600 TABLET ORAL EVERY 6 HOURS PRN
Qty: 60 TABLET | Refills: 3 | Status: SHIPPED | OUTPATIENT
Start: 2017-08-31 | End: 2022-07-15

## 2017-08-31 RX ORDER — HYDROCODONE BITARTRATE AND ACETAMINOPHEN 5; 325 MG/1; MG/1
1 TABLET ORAL EVERY 4 HOURS PRN
Qty: 20 TABLET | Refills: 0 | Status: SHIPPED | OUTPATIENT
Start: 2017-08-31 | End: 2022-07-15

## 2017-08-31 RX ADMIN — ACETAMINOPHEN 650 MG: 325 TABLET ORAL at 12:08

## 2017-08-31 RX ADMIN — DIPHENHYDRAMINE HYDROCHLORIDE 25 MG: 25 CAPSULE ORAL at 12:08

## 2017-08-31 RX ADMIN — SODIUM CHLORIDE: 0.9 INJECTION, SOLUTION INTRAVENOUS at 12:08

## 2017-08-31 RX ADMIN — IBUPROFEN 600 MG: 600 TABLET, FILM COATED ORAL at 01:08

## 2017-08-31 RX ADMIN — IBUPROFEN 600 MG: 600 TABLET, FILM COATED ORAL at 06:08

## 2017-08-31 RX ADMIN — IBUPROFEN 600 MG: 600 TABLET, FILM COATED ORAL at 12:08

## 2017-08-31 NOTE — LACTATION NOTE
"This note was copied from a baby's chart.     08/31/17 1600   Maternal Information   Infant Reason for Referral other (see comments)  (NICU admission)   Maternal Medical Surgical History   History of Preexisting Medical Disorder yes   Medical Disorder other (see comments)  (HSV; h/o abn Pap)   Surgical History no   Infant Information   Infant's Name Mary Ellen   Maternal Infant Assessment   Breast Shape Bilateral:;round   Breast Density Bilateral:;filling   Areola Bilateral:;firm   Nipple(s) Bilateral:;bulbous;other (see comments)  (large)   Infant Assessment   Medical Condition other (see comments)  (RDS; R/O sepsis)   Sucking Reflex present   Rooting Reflex present   Swallow Reflex present   LATCH Score   Latch 2-->grasps breast, tongue down, lips flanged, rhythmic sucking   Audible Swallowing 1-->a few with stimulation   Type Of Nipple 2-->everted (after stimulation)   Comfort (Breast/Nipple) 1-->filling, red/small blisters/bruises, mild/mod discomfort   Hold (Positioning) 1-->minimal assist, teach one side: mother does other, staff holds   Score (less than 7 for 2/more consecutive times, consult Lactation Consultant) 7   Maternal Infant Feeding   Maternal Emotional State assist needed   Infant Positioning clutch/"football";cross-cradle   Signs of Milk Transfer infant jaw motion present;audible swallow   Presence of Pain no   Time Spent (min) 30-60 min   Nipple Shape After Feeding, Left elongated; slightly pinched   Nipple Shape After Feeding, Right elongated; fully rounded   Latch Assistance yes   Breastfeeding Education other (see comments)  (signs of an effective latch )   Breastfeeding History   Breastfeeding History no   Infant First Feeding   Breastfeeding breastfeeding, bilateral   Breastfeeding Left Side (min) 10 Min   Breastfeeding Right Side (min) 10 Min   Feeding Infant   Feeding Readiness Cues nonnutritive sucking;rooting   Satiety Cues cessation of sucking   Feeding Tolerance/Success coordinated " suck;coordinated swallow;strong suck   Effective Latch During Feeding yes   Audible Swallow yes   Suck/Swallow Coordination present   Lactation Referrals   Lactation Consult Initial assessment;Breastfeeding assessment   Lactation Referrals WIC (women, infants and children) program    Breastfeeding   Breast Pumping Interventions post-feed pumping encouraged;other (see comments)  (bedside pumping encouraged)   Lactation Interventions   Attachment Promotion breastfeeding assistance provided;face-to-face positioning promoted;infant-mother separation minimized;privacy provided;skin-to-skin contact encouraged   Breast Care: Breastfeeding milk massaged towards nipple   Breastfeeding Assistance assisted with positioning;feeding cue recognition promoted;infant latch-on verified;infant suck/swallow verified;infant stimulated to wakeful state;support offered;supplemental feeding provided   Maternal Breastfeeding Support encouragement offered;infant-mother separation minimized;lactation counseling provided   Latch Promotion positioning assisted;infant moved to breast;other (see comments)  (suck stimulated with drop of formula)     Met mother and MGM at Mary Ellen's bedside this afternoon; with assistance from [place]-speaking  introduced self to mother/MGM; assisted mother in placing Mary Ellen at breast; praise and encouragement provided to mother; made plans for mother to stay in private room with baby this evening after she is discharged from [facility]; also made arrangements for [place]-speaking  to be present tomorrow afternoon for further assistance in person; mother and MGM voice dunderstanding of plan for tonight and tomorrow

## 2017-08-31 NOTE — PROGRESS NOTES
Dr. Ellis notified morales in place. Pt ambulated x1 per previous nurse- asymptomatic. Pt sitting up on bedside and complains of dizziness. MD placing orders for CBC. Pt safety maintained. Will continue to monitor.

## 2017-08-31 NOTE — ASSESSMENT & PLAN NOTE
- Pre Delivery h/h 9/31 --> Post Delivery h/h 7/22 --> 6.7/21.2  - dizziness with standing  - receiving 2u pRBCs this AM  - will obtain PM CBC  - VSS

## 2017-08-31 NOTE — PLAN OF CARE
Problem: Patient Care Overview  Goal: Plan of Care Review  Outcome: Ongoing (interventions implemented as appropriate)  Pt tolerating PO well, no acute distress, morales to gravity, complaints of dizziness upon sitting, bleeding light, fundus firm, pain well controlled on prescribed meds, Perineum swollen and bruised, NICU visits encouraged. Pt receiving 2 Units, pt safety maintained. Will continue to monitor.

## 2017-08-31 NOTE — SUBJECTIVE & OBJECTIVE
Hospital course: 2017 - admitted for PROM. History of HSV, neg SSE.  2017 - PPD#1 s/p  complicated by atony, s/p cytotec, hemabate, and on cytotec series. Doing well this morning. Right labial swelling noted, not bothering the patient. Also complains of dizziness on standing but none while lying down. No other complaints.   2017 - PPD#2 s/p . Patient receiving 2u pRBCs. Ayoub in place. Dizzy when ambulating prior to receiving transfusion. Will monitor and reassess following transfusion. VSS. Meeting other postpartum goals (eating, no n/v, + flatus).      Interval History:     She is doing well this morning. She is receiving 2u pRBCs. She is tolerating a regular diet without nausea or vomiting. She is not voiding spontaneously. She ambulated yesterday and felt dizziness. She has passed flatus, and has not a BM. Vaginal bleeding is mild. She denies fever or chills. Abdominal pain is mild and controlled with oral medications. She is breastfeeding (pumping for baby in nicu).    Objective:     Vital Signs (Most Recent):  Temp: 97.7 °F (36.5 °C) (17 0545)  Pulse: (!) 56 (17 0545)  Resp: 12 (17 0545)  BP: 125/82 (17 0545)  SpO2: 97 % (17 0545) Vital Signs (24h Range):  Temp:  [97.7 °F (36.5 °C)-99 °F (37.2 °C)] 97.7 °F (36.5 °C)  Pulse:  [56-90] 56  Resp:  [12-20] 12  SpO2:  [97 %-100 %] 97 %  BP: (103-135)/(59-91) 125/82     Weight: 65.8 kg (145 lb)  Body mass index is 25.69 kg/m².      Intake/Output Summary (Last 24 hours) at 17 0704  Last data filed at 17 0630   Gross per 24 hour   Intake          1326.92 ml   Output             3410 ml   Net         -2083.08 ml       Significant Labs:  Lab Results   Component Value Date    GROUPTRH A POS 2017    HEPBSAG Negative 02/10/2017    STREPBCULT No Group B Streptococcus isolated 2017       Recent Labs  Lab 17  2254   HGB 6.7*   HCT 21.2*       I have personallly reviewed all pertinent lab  results from the last 24 hours.    Physical Exam:   Constitutional: She is oriented to person, place, and time. She appears well-developed and well-nourished.    HENT:   Head: Normocephalic and atraumatic.    Eyes: EOM are normal.    Neck: Normal range of motion.    Cardiovascular: Normal rate.     Pulmonary/Chest: Effort normal. No respiratory distress.        Abdominal: Soft. There is no tenderness. There is no rebound and no guarding.   Fundus firm below umbilicus     Genitourinary:   Genitourinary Comments: Right labial swelling. Very mild. No lesions, bruising, discharge.  Bleeding on pad minimal.           Musculoskeletal: Normal range of motion.       Neurological: She is alert and oriented to person, place, and time.    Skin: Skin is warm and dry.    Psychiatric: She has a normal mood and affect. Her behavior is normal. Judgment and thought content normal.

## 2017-08-31 NOTE — PROGRESS NOTES
2356 Dr. Ellis at bedside. MD assessed perineum and discussed plan of care with pt via Naomi. MD states remove morales post transfusion.     0506 Dr. Ellis called RN states CBC to be drawn 4 hours after completion of 2nd unit. MD notified of post transfusion VS. MD states repeat BP. Pt safety maintained. Will continue to monitor.

## 2017-08-31 NOTE — DISCHARGE SUMMARY
[facility]  Obstetrics  Discharge Summary      Patient Name: Klarissa Krause  MRN: [id number]  Admission Date: 2017  Hospital Length of Stay: 2 days  Discharge Date and Time:  2017 3:17 PM  Attending Physician: Julia Polanco MD   Discharging Provider: Alisha Greer MD  Primary Care Provider: Jhony SORIANO Age, APRN    HPI: Klarissa Krause is a 27 y.o. F at 39w2d presents complaining of LOF at 2200. She has been ta q5 min since that time. This IUP is complicated by HSV- on valtrex prophylaxis and denies prodromal symptoms.  Patient reports contractions, denies vaginal bleeding, reports LOF.   Fetal Movement: normal.  Pt denies HA changes/CP/SOB. Denies lightheadedness/dizziness. Denies RUQ or epigastric pain.  She does report blurry vision over the past few hours, denies scotoma.        History obtained with assistance of Naomi .     * No surgery found *     Hospital Course:   2017 - admitted for PROM. History of HSV, neg SSE.  2017 - PPD#1 s/p  complicated by atony, s/p cytotec, hemabate, and on cytotec series. Doing well this morning. Right labial swelling noted, not bothering the patient. Also complains of dizziness on standing but none while lying down. No other complaints.   2017 - PPD#2 s/p . Patient receiving 2u pRBCs. Ayoub in place. Dizzy when ambulating prior to receiving transfusion. Will monitor and reassess following transfusion. VSS. Meeting other postpartum goals (eating, no n/v, + flatus). Post transfusion H/H 9.8, doing well. VSS. Plan for discharge with precautions.       Consults         Status Ordering Provider     Consult to Lactation  Use PRN     Provider:  (Not yet assigned)    Acknowledged YASEMIN LEE          Final Active Diagnoses:    Diagnosis Date Noted POA    PRINCIPAL PROBLEM:   (spontaneous vaginal delivery) [O80] 2017 Not Applicable    Anemia, postpartum [O90.81] 2017 No    Elevated BP  without diagnosis of hypertension [R03.0] 08/29/2017 Unknown    Shoulder dystocia, delivered [O66.0] 08/29/2017 No    HSV (herpes simplex virus) infection [B00.9] 08/02/2017 Yes      Problems Resolved During this Admission:    Diagnosis Date Noted Date Resolved POA    PROM (premature rupture of membranes) [O42.90] 08/29/2017 08/29/2017 Yes        Labs:   CBC   Recent Labs  Lab 08/30/17  0408 08/30/17  2254 08/31/17  1304   WBC 18.18* 12.44 11.54   HGB 7.1* 6.7* 9.8*   HCT 22.5* 21.2* 30.3*    206 215       Feeding Method: breast    Immunizations     Date Immunization Status Dose Route/Site Given by    08/29/17 2053 MMR Incomplete 0.5 mL Subcutaneous/Left deltoid     08/29/17 2053 Tdap Incomplete 0.5 mL Intramuscular/Left deltoid           Delivery:    Episiotomy: None   Lacerations: 2nd   Repair suture:     Repair # of packets: 3   Blood loss (ml): 414     Birth information:  YOB: 2017   Time of birth: 7:40 PM   Sex: female   Delivery type: Vaginal, Spontaneous Delivery   Gestational Age: 39w2d    Delivery Clinician:      Other providers:       Additional  information:  Forceps:    Vacuum:    Breech:    Observed anomalies      Living?:           APGARS  One minute Five minutes Ten minutes   Skin color:         Heart rate:         Grimace:         Muscle tone:         Breathing:         Totals: 3  6  8      Placenta: Delivered:       appearance    Pending Diagnostic Studies:     None          Discharged Condition: good    Disposition: Home or Self Care    Follow Up:  Follow-up Information     Julia Polanco MD. Schedule an appointment as soon as possible for a visit in 6 weeks.    Specialties:  Obstetrics, Obstetrics and Gynecology  Why:  post partum visit  Contact information:  [address] [id number] [phone]                 Patient Instructions:     Diet general     Activity as tolerated     Call MD for:  temperature >100.4     Call MD for:  persistent nausea and  vomiting or diarrhea     Call MD for:  severe uncontrolled pain     Call MD for:  difficulty breathing or increased cough     Call MD for:  severe persistent headache     Call MD for:  worsening rash     Call MD for:  persistent dizziness, light-headedness, or visual disturbances     Call MD for:   Order Comments: If bleeding through more than 2 pads/hr     Call MD for:  increased confusion or weakness       Medications:  Current Discharge Medication List      START taking these medications    Details   hydrocodone-acetaminophen 5-325mg (NORCO) 5-325 mg per tablet Take 1 tablet by mouth every 4 (four) hours as needed.  Qty: 20 tablet, Refills: 0      ibuprofen (ADVIL,MOTRIN) 600 MG tablet Take 1 tablet (600 mg total) by mouth every 6 (six) hours as needed for Pain.  Qty: 60 tablet, Refills: 3         CONTINUE these medications which have NOT CHANGED    Details   PRENATAL 19 29 mg iron- 1 mg Chew TK 1 T PO QD  Refills: 2      valacyclovir (VALTREX) 500 MG tablet Take 1 tablet (500 mg total) by mouth 2 (two) times daily.  Qty: 60 tablet, Refills: 1    Associated Diagnoses: HSV (herpes simplex virus) infection             Alisha Greer MD  Obstetrics  [facility]

## 2017-08-31 NOTE — PROGRESS NOTES
[facility]  Obstetrics  Postpartum Progress Note    Patient Name: Klarissa Krause  MRN: [id number]  Admission Date: 2017  Hospital Length of Stay: 2 days  Attending Physician: Julia Polanco MD  Primary Care Provider: Jhony SORIANO Age, APRN    Subjective:     Principal Problem: (spontaneous vaginal delivery)    Hospital course: 2017 - admitted for PROM. History of HSV, neg SSE.  2017 - PPD#1 s/p  complicated by atony, s/p cytotec, hemabate, and on cytotec series. Doing well this morning. Right labial swelling noted, not bothering the patient. Also complains of dizziness on standing but none while lying down. No other complaints.   2017 - PPD#2 s/p . Patient receiving 2u pRBCs. Ayoub in place. Dizzy when ambulating prior to receiving transfusion. Will monitor and reassess following transfusion. VSS. Meeting other postpartum goals (eating, no n/v, + flatus).      Interval History:     She is doing well this morning. She is receiving 2u pRBCs. She is tolerating a regular diet without nausea or vomiting. She is not voiding spontaneously. She ambulated yesterday and felt dizziness. She has passed flatus, and has not a BM. Vaginal bleeding is mild. She denies fever or chills. Abdominal pain is mild and controlled with oral medications. She is breastfeeding (pumping for baby in nicu).    Objective:     Vital Signs (Most Recent):  Temp: 97.7 °F (36.5 °C) (1745)  Pulse: (!) 56 (17)  Resp: 12 (17)  BP: 125/82 (1745)  SpO2: 97 % (1745) Vital Signs (24h Range):  Temp:  [97.7 °F (36.5 °C)-99 °F (37.2 °C)] 97.7 °F (36.5 °C)  Pulse:  [56-90] 56  Resp:  [12-20] 12  SpO2:  [97 %-100 %] 97 %  BP: (103-135)/(59-91) 125/82     Weight: 65.8 kg (145 lb)  Body mass index is 25.69 kg/m².      Intake/Output Summary (Last 24 hours) at 17 0704  Last data filed at 17 0630   Gross per 24 hour   Intake          1326.92 ml   Output              3410 ml   Net         -2083.08 ml       Significant Labs:  Lab Results   Component Value Date    GROUPTRH A POS 2017    HEPBSAG Negative 02/10/2017    STREPBCULT No Group B Streptococcus isolated 2017       Recent Labs  Lab 17  2254   HGB 6.7*   HCT 21.2*       I have personallly reviewed all pertinent lab results from the last 24 hours.    Physical Exam:   Constitutional: She is oriented to person, place, and time. She appears well-developed and well-nourished.    HENT:   Head: Normocephalic and atraumatic.    Eyes: EOM are normal.    Neck: Normal range of motion.    Cardiovascular: Normal rate.     Pulmonary/Chest: Effort normal. No respiratory distress.        Abdominal: Soft. There is no tenderness. There is no rebound and no guarding.   Fundus firm below umbilicus     Genitourinary:   Genitourinary Comments: Right labial swelling. Very mild. No lesions, bruising, discharge.  Bleeding on pad minimal.           Musculoskeletal: Normal range of motion.       Neurological: She is alert and oriented to person, place, and time.    Skin: Skin is warm and dry.    Psychiatric: She has a normal mood and affect. Her behavior is normal. Judgment and thought content normal.       Assessment/Plan:     27 y.o. female  for:    Anemia, postpartum    - Pre Delivery h/h  --> Post Delivery h/h  --> 6.7/21.2  - dizziness with standing  - receiving 2u pRBCs this AM  - will obtain PM CBC  - VSS        Elevated BP without diagnosis of hypertension    - BP: (103-135)/(59-91) 125/82 - normal range.  - Pre-eclampsia labs: p/c ratio 1.45.  - Will continue to monitor  - denies Pre-E symptoms        HSV (herpes simplex virus) infection    - Has been taking Valtrex  - Denies prodromal sx  - No lesions noted on SSE once fluid cleared        *  (spontaneous vaginal delivery)    Postpartum care:  - Patient doing well. Continue routine management and advances.  - Continue PO pain meds. Pain well controlled.  -  Encourage ambulation.   - Heme: Pre Delivery h/h 9/31 --> Post Delivery h/h 7/22  - Contraception - address at postpartum followup  - Lactation - The patient is breastfeeding. Lactation nurse following along PRN  - Rh Status - pos              Disposition: Will plan to discharge once symptomatic anemia has resolved and patient can void spontaneously.    Sushma K Reddy, MD  Obstetrics  [facility]

## 2017-08-31 NOTE — ASSESSMENT & PLAN NOTE
- BP: (103-135)/(59-91) 125/82 - normal range.  - Pre-eclampsia labs: p/c ratio 1.45.  - Will continue to monitor  - denies Pre-E symptoms

## 2017-08-31 NOTE — PROGRESS NOTES
Mother Baby care-guide reviewed (transaltioned by Kecia PERALES). Questions answered.  D/C instructions given to patient and family members.  No distress noted.  Discharged to private room on 5th floor.

## 2017-08-31 NOTE — PLAN OF CARE
Problem: Patient Care Overview  Goal: Plan of Care Review  Outcome: Ongoing (interventions implemented as appropriate)  Pump 8 or more times a day for 15 -20 minutes. Clean pump parts and sterilize once a day.

## 2017-08-31 NOTE — LACTATION NOTE
JEB informed mother that she may go to NICU and feed her baby. NICU LC got an order. Mother will call  when she gets back to to NICU DC.

## 2022-03-03 LAB
C TRACH RRNA SPEC QL PROBE: NEGATIVE
HBV SURFACE AG SERPL QL IA: NEGATIVE
HCT VFR BLD AUTO: 36 % (ref 36–46)
HGB BLD-MCNC: 11.6 G/DL (ref 12–16)
HIV 1+2 AB+HIV1 P24 AG SERPL QL IA: NEGATIVE
INDIRECT COOMBS: NEGATIVE
N GONORRHOEAE, AMPLIFIED DNA: NEGATIVE
PLATELET # BLD AUTO: 268 K/UL (ref 150–399)
RPR: NON REACTIVE
RUBELLA IMMUNE STATUS: NORMAL

## 2022-03-11 DIAGNOSIS — Z36.89 ESTABLISH GESTATIONAL AGE, ULTRASOUND: Primary | ICD-10-CM

## 2022-03-24 ENCOUNTER — PROCEDURE VISIT (OUTPATIENT)
Dept: MATERNAL FETAL MEDICINE | Facility: CLINIC | Age: 32
End: 2022-03-24
Payer: MEDICAID

## 2022-03-24 DIAGNOSIS — Z36.2 ENCOUNTER FOR FOLLOW-UP ULTRASOUND OF FETAL ANATOMY: Primary | ICD-10-CM

## 2022-03-24 DIAGNOSIS — Z36.89 ESTABLISH GESTATIONAL AGE, ULTRASOUND: ICD-10-CM

## 2022-03-24 PROCEDURE — 76805 US MFM PROCEDURE (VIEWPOINT): ICD-10-PCS | Mod: 26,S$PBB,, | Performed by: OBSTETRICS & GYNECOLOGY

## 2022-03-24 PROCEDURE — 76805 OB US >/= 14 WKS SNGL FETUS: CPT | Mod: PBBFAC | Performed by: OBSTETRICS & GYNECOLOGY

## 2022-04-20 LAB
GLUCOSE SERPL-MCNC: 116 MG/DL
HCT VFR BLD AUTO: 34.3 % (ref 36–46)
HGB BLD-MCNC: 11.1 G/DL (ref 12–16)
PLATELET # BLD AUTO: 296 K/UL (ref 150–399)

## 2022-07-01 ENCOUNTER — INITIAL PRENATAL (OUTPATIENT)
Dept: OBSTETRICS AND GYNECOLOGY | Facility: CLINIC | Age: 32
End: 2022-07-01
Payer: MEDICAID

## 2022-07-01 ENCOUNTER — PROCEDURE VISIT (OUTPATIENT)
Dept: OBSTETRICS AND GYNECOLOGY | Facility: CLINIC | Age: 32
End: 2022-07-01
Payer: MEDICAID

## 2022-07-01 VITALS
SYSTOLIC BLOOD PRESSURE: 114 MMHG | DIASTOLIC BLOOD PRESSURE: 72 MMHG | BODY MASS INDEX: 26.95 KG/M2 | WEIGHT: 152.13 LBS

## 2022-07-01 DIAGNOSIS — B00.9 HSV INFECTION: ICD-10-CM

## 2022-07-01 DIAGNOSIS — Z34.83 ENCOUNTER FOR SUPERVISION OF OTHER NORMAL PREGNANCY IN THIRD TRIMESTER: ICD-10-CM

## 2022-07-01 DIAGNOSIS — Z34.83 ENCOUNTER FOR SUPERVISION OF OTHER NORMAL PREGNANCY IN THIRD TRIMESTER: Primary | ICD-10-CM

## 2022-07-01 LAB
BASOPHILS # BLD AUTO: 0.02 K/UL (ref 0–0.2)
BASOPHILS NFR BLD: 0.3 % (ref 0–1.9)
DIFFERENTIAL METHOD: ABNORMAL
EOSINOPHIL # BLD AUTO: 0 K/UL (ref 0–0.5)
EOSINOPHIL NFR BLD: 0.3 % (ref 0–8)
ERYTHROCYTE [DISTWIDTH] IN BLOOD BY AUTOMATED COUNT: 12.9 % (ref 11.5–14.5)
HCT VFR BLD AUTO: 33.3 % (ref 37–48.5)
HGB BLD-MCNC: 10.5 G/DL (ref 12–16)
IMM GRANULOCYTES # BLD AUTO: 0.05 K/UL (ref 0–0.04)
IMM GRANULOCYTES NFR BLD AUTO: 0.6 % (ref 0–0.5)
LYMPHOCYTES # BLD AUTO: 1.3 K/UL (ref 1–4.8)
LYMPHOCYTES NFR BLD: 17.3 % (ref 18–48)
MCH RBC QN AUTO: 28.3 PG (ref 27–31)
MCHC RBC AUTO-ENTMCNC: 31.5 G/DL (ref 32–36)
MCV RBC AUTO: 90 FL (ref 82–98)
MONOCYTES # BLD AUTO: 0.7 K/UL (ref 0.3–1)
MONOCYTES NFR BLD: 8.9 % (ref 4–15)
NEUTROPHILS # BLD AUTO: 5.6 K/UL (ref 1.8–7.7)
NEUTROPHILS NFR BLD: 72.6 % (ref 38–73)
NRBC BLD-RTO: 0 /100 WBC
PLATELET # BLD AUTO: 341 K/UL (ref 150–450)
PMV BLD AUTO: 10.6 FL (ref 9.2–12.9)
RBC # BLD AUTO: 3.71 M/UL (ref 4–5.4)
RPR SER QL: NORMAL
WBC # BLD AUTO: 7.71 K/UL (ref 3.9–12.7)

## 2022-07-01 PROCEDURE — 99999 PR PBB SHADOW E&M-EST. PATIENT-LVL II: ICD-10-PCS | Mod: PBBFAC,,, | Performed by: ADVANCED PRACTICE MIDWIFE

## 2022-07-01 PROCEDURE — 99203 OFFICE O/P NEW LOW 30 MIN: CPT | Mod: S$PBB,TH,, | Performed by: ADVANCED PRACTICE MIDWIFE

## 2022-07-01 PROCEDURE — 87081 CULTURE SCREEN ONLY: CPT | Performed by: ADVANCED PRACTICE MIDWIFE

## 2022-07-01 PROCEDURE — 99203 PR OFFICE/OUTPT VISIT, NEW, LEVL III, 30-44 MIN: ICD-10-PCS | Mod: S$PBB,TH,, | Performed by: ADVANCED PRACTICE MIDWIFE

## 2022-07-01 PROCEDURE — 99212 OFFICE O/P EST SF 10 MIN: CPT | Mod: PBBFAC,TH,PN | Performed by: ADVANCED PRACTICE MIDWIFE

## 2022-07-01 PROCEDURE — 86592 SYPHILIS TEST NON-TREP QUAL: CPT | Performed by: ADVANCED PRACTICE MIDWIFE

## 2022-07-01 PROCEDURE — 85025 COMPLETE CBC W/AUTO DIFF WBC: CPT | Performed by: ADVANCED PRACTICE MIDWIFE

## 2022-07-01 PROCEDURE — 87389 HIV-1 AG W/HIV-1&-2 AB AG IA: CPT | Performed by: ADVANCED PRACTICE MIDWIFE

## 2022-07-01 PROCEDURE — 99999 PR PBB SHADOW E&M-EST. PATIENT-LVL II: CPT | Mod: PBBFAC,,, | Performed by: ADVANCED PRACTICE MIDWIFE

## 2022-07-01 NOTE — PROGRESS NOTES
Reason for visit: Routine Prenatal Visit      HPI:   31 y.o., at 35w3d by Estimated Date of Delivery: 22    Patient presents as a transfer from [facility] for prenatal care. Patient has no complaints.    - Contractions: Denies  - Bleeding: Denies  - Loss of fluid: Denies  - Fetal movement: Reports good fetal movement. Reinforced BID  - Nausea: Denies   - Vomiting: Denies  - Headache: Denies      Reviewed:    Past medical, surgical, social, family, and obstetric history: Reviewed and updated in EMR.  Medications: Reviewed and updated in EMR.  Allergies: Patient has no known allergies.    Pregnancy dating, labs, ultrasound reports, prenatal testing, and problem list: Reviewed and updated in EMR.  Outside records: Reviewed records from [facility]  Independent interpretation of tests: n/a  Discussion with another healthcare professional: n/a      Vitals: /72   Wt 69 kg (152 lb 1.9 oz)   BMI 26.95 kg/m²     Physical exam:  GENERAL: No acute distress  ABD: Gravid      Assessment and Plan:    Encounter for supervision of other normal pregnancy in third trimester  -     US MFM Procedure (Viewpoint); Future; Expected date: 07/15/2022  -     CBC Auto Differential; Future; Expected date: 2022  -     HIV 1/2 Ag/Ab (4th Gen); Future; Expected date: 2022  -     RPR; Future; Expected date: 2022  -     Strep B Screen, Vaginal / Rectal         Discussed HSV and valtrex at 36 weeks- 500mg PO BID- rx sent     labor precautions given  Follow-up: 1 weeks      I spent a total of 20 minutes on the day of the visit. This includes face to face time and non-face to face time preparing to see the patient (eg, review of tests), Obtaining and/or reviewing separately obtained history, Documenting clinical information in the electronic or other health record, Independently interpreting results and communicating results to the patient/family/caregiver, or Care  coordination.

## 2022-07-01 NOTE — PROGRESS NOTES
Lab Documentation:    Order Type: Written Order placed in [facility]    Patient in for lab visit only per provider treatment plan.

## 2022-07-04 LAB
BACTERIA SPEC AEROBE CULT: NORMAL
HIV 1+2 AB+HIV1 P24 AG SERPL QL IA: NEGATIVE

## 2022-07-07 RX ORDER — VALACYCLOVIR HYDROCHLORIDE 500 MG/1
500 TABLET, FILM COATED ORAL 2 TIMES DAILY
Qty: 60 TABLET | Refills: 0 | Status: SHIPPED | OUTPATIENT
Start: 2022-07-07 | End: 2022-08-06

## 2022-07-08 ENCOUNTER — PROCEDURE VISIT (OUTPATIENT)
Dept: MATERNAL FETAL MEDICINE | Facility: CLINIC | Age: 32
End: 2022-07-08
Payer: MEDICAID

## 2022-07-08 DIAGNOSIS — Z34.83 ENCOUNTER FOR SUPERVISION OF OTHER NORMAL PREGNANCY IN THIRD TRIMESTER: ICD-10-CM

## 2022-07-08 PROCEDURE — 76816 OB US FOLLOW-UP PER FETUS: CPT | Mod: PBBFAC | Performed by: OBSTETRICS & GYNECOLOGY

## 2022-07-08 PROCEDURE — 76816 US MFM PROCEDURE (VIEWPOINT): ICD-10-PCS | Mod: 26,S$PBB,, | Performed by: OBSTETRICS & GYNECOLOGY

## 2022-07-15 ENCOUNTER — ROUTINE PRENATAL (OUTPATIENT)
Dept: OBSTETRICS AND GYNECOLOGY | Facility: CLINIC | Age: 32
End: 2022-07-15
Payer: MEDICAID

## 2022-07-15 DIAGNOSIS — O09.293 PREGNANCY WITH POOR REPRODUCTIVE HISTORY IN THIRD TRIMESTER: ICD-10-CM

## 2022-07-15 PROBLEM — O09.299 PREGNANCY WITH POOR REPRODUCTIVE HISTORY, ANTEPARTUM: Status: ACTIVE | Noted: 2017-03-30

## 2022-07-15 PROBLEM — B00.9 HERPES SIMPLEX TYPE 2 (HSV-2) INFECTION AFFECTING PREGNANCY, ANTEPARTUM: Status: ACTIVE | Noted: 2022-07-15

## 2022-07-15 PROBLEM — O98.519 HERPES SIMPLEX TYPE 2 (HSV-2) INFECTION AFFECTING PREGNANCY, ANTEPARTUM: Status: ACTIVE | Noted: 2022-07-15

## 2022-07-15 PROCEDURE — 99999 PR PBB SHADOW E&M-EST. PATIENT-LVL II: ICD-10-PCS | Mod: PBBFAC,,, | Performed by: OBSTETRICS & GYNECOLOGY

## 2022-07-15 PROCEDURE — 99213 OFFICE O/P EST LOW 20 MIN: CPT | Mod: TH,S$PBB,, | Performed by: OBSTETRICS & GYNECOLOGY

## 2022-07-15 PROCEDURE — 99212 OFFICE O/P EST SF 10 MIN: CPT | Mod: PBBFAC,TH,PN | Performed by: OBSTETRICS & GYNECOLOGY

## 2022-07-15 PROCEDURE — 99999 PR PBB SHADOW E&M-EST. PATIENT-LVL II: CPT | Mod: PBBFAC,,, | Performed by: OBSTETRICS & GYNECOLOGY

## 2022-07-15 PROCEDURE — 99213 PR OFFICE/OUTPT VISIT, EST, LEVL III, 20-29 MIN: ICD-10-PCS | Mod: TH,S$PBB,, | Performed by: OBSTETRICS & GYNECOLOGY

## 2022-07-15 NOTE — PROGRESS NOTES
Pregnancy dating, labs, ultrasound reports, prenatal testing, and problem list; prior records and results; and available outside records were reviewed and updated in EMR.  Pertinent findings were noted below.    Reason for Visit: Routine Prenatal Visit (Vaginal area hurting )    37w3d by Estimated Date of Delivery: 8/2/22    currently breastfeeding.    Pregnancy with poor reproductive history in third trimester       No contractions, bleeding, or loss of fluid.  Feeling some pressure.  1-cm dilated on exam.  +fetal movement.  Labs reviewed and up to date.  Normal growth on u/s.  Pt denies any fractures or nerve damage in previous delivery.  30-sec.  OK for vaginal delivery.    Labor precautions given  Follow-up: 1 week

## 2022-07-20 ENCOUNTER — ROUTINE PRENATAL (OUTPATIENT)
Dept: OBSTETRICS AND GYNECOLOGY | Facility: CLINIC | Age: 32
End: 2022-07-20
Payer: MEDICAID

## 2022-07-20 VITALS
DIASTOLIC BLOOD PRESSURE: 74 MMHG | WEIGHT: 157.63 LBS | BODY MASS INDEX: 27.93 KG/M2 | SYSTOLIC BLOOD PRESSURE: 116 MMHG

## 2022-07-20 DIAGNOSIS — O09.293 PREGNANCY WITH POOR REPRODUCTIVE HISTORY IN THIRD TRIMESTER: ICD-10-CM

## 2022-07-20 DIAGNOSIS — B00.9 HSV INFECTION: ICD-10-CM

## 2022-07-20 DIAGNOSIS — Z34.83 ENCOUNTER FOR SUPERVISION OF OTHER NORMAL PREGNANCY IN THIRD TRIMESTER: ICD-10-CM

## 2022-07-20 PROCEDURE — 99999 PR PBB SHADOW E&M-EST. PATIENT-LVL I: CPT | Mod: PBBFAC,,, | Performed by: OBSTETRICS & GYNECOLOGY

## 2022-07-20 PROCEDURE — 99213 PR OFFICE/OUTPT VISIT, EST, LEVL III, 20-29 MIN: ICD-10-PCS | Mod: TH,S$PBB,, | Performed by: OBSTETRICS & GYNECOLOGY

## 2022-07-20 PROCEDURE — 99999 PR PBB SHADOW E&M-EST. PATIENT-LVL I: ICD-10-PCS | Mod: PBBFAC,,, | Performed by: OBSTETRICS & GYNECOLOGY

## 2022-07-20 PROCEDURE — 99211 OFF/OP EST MAY X REQ PHY/QHP: CPT | Mod: PBBFAC,TH,PN | Performed by: OBSTETRICS & GYNECOLOGY

## 2022-07-20 PROCEDURE — 99213 OFFICE O/P EST LOW 20 MIN: CPT | Mod: TH,S$PBB,, | Performed by: OBSTETRICS & GYNECOLOGY

## 2022-07-20 NOTE — PROGRESS NOTES
Pregnancy dating, labs, ultrasound reports, prenatal testing, and problem list; prior records and results; and available outside records were reviewed and updated in EMR.  Pertinent findings were noted below.    Reason for Visit   Routine Prenatal Visit    HPI   31 y.o., at 38w1d by Estimated Date of Delivery: 8/2/22      Contractions: No   Bleeding: No   Loss of fluid: No   Fetal movement: Yes   Nausea: No   Vomiting: No   Headache: No     Exam   /74   Wt 71.5 kg (157 lb 10.1 oz)   BMI 27.93 kg/m²     GENERAL: No acute distress  ABD: Gravid    Assessment and Plan   Care and examination of lactating mother  -     BREAST PUMP FOR HOME USE    Encounter for supervision of other normal pregnancy in third trimester  -     IP OB Labor Induction; Future; Expected date: 08/03/2022    Pregnancy with poor reproductive history in third trimester  - Hx of shoulder dystocia     HSV infection antepartum  -Continue taking Valtrex     Patient desires eIOL. Request sent for 8/3/22 at 5PM   Consents signed    Post partum BC- BTL, consents signed    Patient plans to breastfeed, pump Rx provided    Labor precautions given  Follow-up: 1 week    Shonda Lindquist MD  [facility]   OBGYN PGY1

## 2022-07-20 NOTE — H&P (VIEW-ONLY)
HISTORY AND PHYSICAL                                                OBSTETRICS          Subjective:       Klarissa Krause is a 31 y.o.  female with IUP who presents for IOL.    Patient denies contractions, denies vaginal bleeding, denies LOF.   Fetal Movement: normal.    This IUP is complicated by HSV antepartum, hx of shoulder dystocia in prior pregnancy.    Review of Systems   Constitutional: Negative for fever.   Respiratory: Negative for shortness of breath.    Cardiovascular: Negative for chest pain.   Gastrointestinal: Negative for abdominal pain, nausea and vomiting.   Endocrine: Negative for hot flashes.   Genitourinary: Negative for menstrual problem.   Integumentary:  Negative for breast mass, nipple discharge and breast skin changes.   Neurological: Negative for headaches.   Hematological: Does not bruise/bleed easily.   Psychiatric/Behavioral: Negative for depression.   Breast: Negative for mass, mastodynia, nipple discharge and skin changes      PMHx:   Past Medical History:   Diagnosis Date    Abnormal Pap smear of cervix        PSHx: No past surgical history on file.    All: Review of patient's allergies indicates:  No Known Allergies    Meds: (Not in a hospital admission)      SH:   Social History     Socioeconomic History    Marital status: Single   Tobacco Use    Smoking status: Never Smoker    Smokeless tobacco: Never Used   Substance and Sexual Activity    Alcohol use: No    Drug use: No    Sexual activity: Yes     Birth control/protection: None       FH:   Family History   Problem Relation Age of Onset    Cancer Maternal Grandmother     Stroke Maternal Grandfather     Diabetes Maternal Uncle     Colon cancer Neg Hx     Ovarian cancer Neg Hx        OBHx:   OB History    Para Term  AB Living   2 1 1 0 0 1   SAB IAB Ectopic Multiple Live Births   0 0 0 0 1      # Outcome Date GA Lbr Vlad/2nd Weight Sex Delivery Anes PTL Lv   2 Current            1 Term 17 39w2d   3.73 kg (8 lb 3.6 oz) F Vag-Spont EPI N SAMARIA      Complications: Shoulder Dystocia, Chorioamnionitis      Name: ARTEM MCCALL      Apgar1: 3  Apgar5: 6       Objective:       /74   Wt 71.5 kg (157 lb 10.1 oz)   BMI 27.93 kg/m²     Vitals:    07/20/22 1036   BP: 116/74   Weight: 71.5 kg (157 lb 10.1 oz)       General:   alert, appears stated age and cooperative, no apparent distress   HENT:  normocephalic, atraumatic   Eyes:  extraocular movements and conjunctivae normal   Neck:  supple, range of motion normal, no thyromegaly   Lungs:   no respiratory distress   Heart:   regular rate   Abdomen:  soft, non-tender, non-distended but gravid, no rebound or guarding   Extremities negative edema, negative erythema   FHT: To be performed upon admission                 TOCO: To be performed upon admission   Presentations: To be performed upon admission   Cervix: To be performed upon admission   Sterile Speculum Exam: Deferred    EFW by Leopold's: 7lb    Recent Growth Scan: 42%    Lab Review  Blood Type A POS  GBBS: negative  Rubella: Immune  RPR: non-reactive  HIV: negative  HepB: negative       Assessment:       38w1d weeks gestation for IOL.         Plan:   HSV infection affecting pregnancy, antepartum  - Patient taking Valtrex 500mg BID since 36 weeks    Hx of shoulder dystocia in prior pregnancy    Induction   Risks, benefits, alternatives and possible complications have been discussed in detail with the patient.   - Consents signed and to chart  - Admit to Labor and Delivery unit  - Epidural per Anesthesia  - Draw CBC, T&S  - IOL plan per L&D team  - EFW 42%    Post-Partum Hemorrhage risk - low     Shonda Lindquist MD  [facility]   OBGYN PGY1

## 2022-07-25 ENCOUNTER — ROUTINE PRENATAL (OUTPATIENT)
Dept: OBSTETRICS AND GYNECOLOGY | Facility: CLINIC | Age: 32
End: 2022-07-25
Payer: MEDICAID

## 2022-07-25 VITALS — BODY MASS INDEX: 28.09 KG/M2 | DIASTOLIC BLOOD PRESSURE: 72 MMHG | SYSTOLIC BLOOD PRESSURE: 110 MMHG | WEIGHT: 158.5 LBS

## 2022-07-25 DIAGNOSIS — Z34.83 ENCOUNTER FOR SUPERVISION OF OTHER NORMAL PREGNANCY IN THIRD TRIMESTER: Primary | ICD-10-CM

## 2022-07-25 DIAGNOSIS — Z78.9 LANGUAGE BARRIER TO COMMUNICATION: ICD-10-CM

## 2022-07-25 PROCEDURE — 99212 OFFICE O/P EST SF 10 MIN: CPT | Mod: PBBFAC,TH,PN | Performed by: ADVANCED PRACTICE MIDWIFE

## 2022-07-25 PROCEDURE — 99999 PR PBB SHADOW E&M-EST. PATIENT-LVL II: ICD-10-PCS | Mod: PBBFAC,,, | Performed by: ADVANCED PRACTICE MIDWIFE

## 2022-07-25 PROCEDURE — 99213 PR OFFICE/OUTPT VISIT, EST, LEVL III, 20-29 MIN: ICD-10-PCS | Mod: TH,S$PBB,, | Performed by: ADVANCED PRACTICE MIDWIFE

## 2022-07-25 PROCEDURE — 99213 OFFICE O/P EST LOW 20 MIN: CPT | Mod: TH,S$PBB,, | Performed by: ADVANCED PRACTICE MIDWIFE

## 2022-07-25 PROCEDURE — 99999 PR PBB SHADOW E&M-EST. PATIENT-LVL II: CPT | Mod: PBBFAC,,, | Performed by: ADVANCED PRACTICE MIDWIFE

## 2022-07-25 RX ORDER — DIPHENOXYLATE HYDROCHLORIDE AND ATROPINE SULFATE 2.5; .025 MG/1; MG/1
1 TABLET ORAL 4 TIMES DAILY PRN
Status: CANCELLED | OUTPATIENT
Start: 2022-07-25

## 2022-07-25 RX ORDER — LIDOCAINE HYDROCHLORIDE 10 MG/ML
10 INJECTION INFILTRATION; PERINEURAL ONCE AS NEEDED
Status: CANCELLED | OUTPATIENT
Start: 2022-07-25 | End: 2033-12-21

## 2022-07-25 RX ORDER — SODIUM CHLORIDE, SODIUM LACTATE, POTASSIUM CHLORIDE, CALCIUM CHLORIDE 600; 310; 30; 20 MG/100ML; MG/100ML; MG/100ML; MG/100ML
INJECTION, SOLUTION INTRAVENOUS CONTINUOUS
Status: CANCELLED | OUTPATIENT
Start: 2022-07-25

## 2022-07-25 RX ORDER — CALCIUM CARBONATE 200(500)MG
500 TABLET,CHEWABLE ORAL 3 TIMES DAILY PRN
Status: CANCELLED | OUTPATIENT
Start: 2022-07-25

## 2022-07-25 RX ORDER — ONDANSETRON 4 MG/1
8 TABLET, ORALLY DISINTEGRATING ORAL EVERY 8 HOURS PRN
Status: CANCELLED | OUTPATIENT
Start: 2022-07-25

## 2022-07-25 RX ORDER — OXYTOCIN/RINGER'S LACTATE 30/500 ML
95 PLASTIC BAG, INJECTION (ML) INTRAVENOUS ONCE
Status: CANCELLED | OUTPATIENT
Start: 2022-07-25 | End: 2022-07-25

## 2022-07-25 RX ORDER — OXYTOCIN/RINGER'S LACTATE 30/500 ML
334 PLASTIC BAG, INJECTION (ML) INTRAVENOUS ONCE
Status: CANCELLED | OUTPATIENT
Start: 2022-07-25 | End: 2022-07-25

## 2022-07-25 RX ORDER — PROCHLORPERAZINE EDISYLATE 5 MG/ML
5 INJECTION INTRAMUSCULAR; INTRAVENOUS EVERY 6 HOURS PRN
Status: CANCELLED | OUTPATIENT
Start: 2022-07-25

## 2022-07-25 RX ORDER — SIMETHICONE 80 MG
1 TABLET,CHEWABLE ORAL 4 TIMES DAILY PRN
Status: CANCELLED | OUTPATIENT
Start: 2022-07-25

## 2022-07-25 RX ORDER — METHYLERGONOVINE MALEATE 0.2 MG/ML
200 INJECTION INTRAVENOUS
Status: CANCELLED | OUTPATIENT
Start: 2022-07-25

## 2022-07-25 RX ORDER — CARBOPROST TROMETHAMINE 250 UG/ML
250 INJECTION, SOLUTION INTRAMUSCULAR
Status: CANCELLED | OUTPATIENT
Start: 2022-07-25

## 2022-07-25 RX ORDER — MUPIROCIN 20 MG/G
OINTMENT TOPICAL
Status: CANCELLED | OUTPATIENT
Start: 2022-07-25

## 2022-07-25 RX ORDER — MISOPROSTOL 100 UG/1
800 TABLET ORAL
Status: CANCELLED | OUTPATIENT
Start: 2022-07-25

## 2022-07-25 NOTE — PROGRESS NOTES
Reason for visit: Routine Prenatal Visit      HPI:   31 y.o., at 38w6d by Estimated Date of Delivery: 8/2/22    Translation service used, no c/o    - Contractions: denies  - Bleeding: denies  - Loss of fluid: denies  - Fetal movement: reports good fM, reinforced BId  - Nausea: no  - Vomiting: no  - Headache: no      Reviewed:    Past medical, surgical, social, family, and obstetric history: Reviewed and updated in EMR.  Medications: Reviewed and updated in EMR.  Allergies: Patient has no known allergies.    Pregnancy dating, labs, ultrasound reports, prenatal testing, and problem list: Reviewed and updated in EMR.  Outside records: na  Independent interpretation of tests: na  Discussion with another healthcare professional: na      Vitals: /72   Wt 71.9 kg (158 lb 8.2 oz)   BMI 28.09 kg/m²     Physical exam:  GENERAL: No acute distress  ABD: Gravid      Assessment and Plan:    Encounter for supervision of other normal pregnancy in third trimester    Language barrier to communication         Reviewed plan for IOL scheduled 08/03/22 at 5PM, instructions given, discussed Hibiclens shower, diet prior to IOL and notification per L&D.   Reviewed s/s active labor, rom, bleeding and if occur eport to L&D on 6th floor at [facility]   H&P/ orders in    Vtx per vscan at visit    Follow-up: PP visit      I spent a total of 20 minutes on the day of the visit. This includes face to face time and non-face to face time preparing to see the patient (eg, review of tests), Obtaining and/or reviewing separately obtained history, Documenting clinical information in the electronic or other health record, Independently interpreting results and communicating results to the patient/family/caregiver, or Care coordination.

## 2022-07-25 NOTE — H&P
.     HISTORY AND PHYSICAL                                                OBSTETRICS          Subjective:       Klarissa Krause is a 31 y.o.  female with IUP at 38w6d weeks gestation who presents for routine office visit. .    Patient denies contractions, denies vaginal bleeding, denies LOF.   Fetal Movement: normal.    This IUP is complicated by  Language barrier- Swedish.                                                Hx shoulder dystocia previous delivery- see del note    Review of Systems   Constitutional: Negative.    HENT: Negative.    Eyes: Negative.    Respiratory: Negative.    Cardiovascular: Negative.    Genitourinary: Negative.    Musculoskeletal: Negative.    Neurological: Negative.        PMHx:   Past Medical History:   Diagnosis Date    Abnormal Pap smear of cervix        PSHx: History reviewed. No pertinent surgical history.    All: Review of patient's allergies indicates:  No Known Allergies    Meds: (Not in a hospital admission)      SH:   Social History     Socioeconomic History    Marital status: Single   Tobacco Use    Smoking status: Never Smoker    Smokeless tobacco: Never Used   Substance and Sexual Activity    Alcohol use: No    Drug use: No    Sexual activity: Yes     Birth control/protection: None       FH:   Family History   Problem Relation Age of Onset    Cancer Maternal Grandmother     Stroke Maternal Grandfather     Diabetes Maternal Uncle     Colon cancer Neg Hx     Ovarian cancer Neg Hx        OBHx:   OB History    Para Term  AB Living   2 1 1 0 0 1   SAB IAB Ectopic Multiple Live Births   0 0 0 0 1      # Outcome Date GA Lbr Vlad/2nd Weight Sex Delivery Anes PTL Lv   2 Current            1 Term 17 39w2d  3.73 kg (8 lb 3.6 oz) F Vag-Spont EPI N SAMARIA      Complications: Shoulder Dystocia, Chorioamnionitis      Name: CAL, ARTEM HENRIQUEZ      Apgar1: 3  Apgar5: 6       Objective:       /72   Wt 71.9 kg (158 lb 8.2 oz)   BMI 28.09 kg/m²     Vitals:     07/25/22 0853   BP: 110/72   Weight: 71.9 kg (158 lb 8.2 oz)       General:   alert, appears stated age and cooperative, no apparent distress   HENT:  normocephalic, atraumatic   Eyes:  extraocular movements and conjunctivae normal   Neck:  supple, range of motion normal, no thyromegaly   Lungs:   no respiratory distress   Heart:   regular rate   Abdomen:  soft, non-tender, non-distended but gravid, no rebound or guarding   Extremities negative edema, negative erythema   FHT: To be performed upon admission                 TOCO: To be performed upon admission   Presentations: To be performed upon admission   Cervix: To be performed upon admission   Sterile Speculum Exam: PRN on admit    EFW by Leopold's: 8#    Recent Growth Scan: 36 weeks- 42%    Lab Review  Blood Type A POS  GBBS: negative  Rubella: Immune  RPR:  Non reactive  HIV: negative  HepB: negative       Assessment:        IUP with ALYSSA 08/02/22         Plan:      Risks, benefits, alternatives and possible complications have been discussed in detail with the patient.   - Consents signed and to chart  - Admit to Labor and Delivery unit  - SVE on admit   - Epidural per Anesthesia  - Draw CBC, T&S  - IOL plan per L&D team  - EFW  8#  - Maternal pelvis proven to 8#- hx shoulder dystocia    Post-Partum Hemorrhage risk - low

## 2022-08-04 ENCOUNTER — HOSPITAL ENCOUNTER (INPATIENT)
Facility: OTHER | Age: 32
LOS: 2 days | Discharge: HOME | End: 2022-08-06
Attending: OBSTETRICS & GYNECOLOGY | Admitting: OBSTETRICS & GYNECOLOGY
Payer: MEDICAID

## 2022-08-04 ENCOUNTER — ANESTHESIA EVENT (OUTPATIENT)
Dept: OBSTETRICS AND GYNECOLOGY | Facility: OTHER | Age: 32
End: 2022-08-04
Payer: MEDICAID

## 2022-08-04 ENCOUNTER — ANESTHESIA (OUTPATIENT)
Dept: OBSTETRICS AND GYNECOLOGY | Facility: OTHER | Age: 32
End: 2022-08-04
Payer: MEDICAID

## 2022-08-04 DIAGNOSIS — Z34.90 ENCOUNTER FOR INDUCTION OF LABOR: ICD-10-CM

## 2022-08-04 DIAGNOSIS — Z34.83 ENCOUNTER FOR SUPERVISION OF OTHER NORMAL PREGNANCY IN THIRD TRIMESTER: ICD-10-CM

## 2022-08-04 LAB
ABO + RH BLD: NORMAL
BASOPHILS # BLD AUTO: 0.02 K/UL (ref 0–0.2)
BASOPHILS NFR BLD: 0.3 % (ref 0–1.9)
BLD GP AB SCN CELLS X3 SERPL QL: NORMAL
DIFFERENTIAL METHOD: ABNORMAL
EOSINOPHIL # BLD AUTO: 0 K/UL (ref 0–0.5)
EOSINOPHIL NFR BLD: 0.4 % (ref 0–8)
ERYTHROCYTE [DISTWIDTH] IN BLOOD BY AUTOMATED COUNT: 14.6 % (ref 11.5–14.5)
HCT VFR BLD AUTO: 30.7 % (ref 37–48.5)
HGB BLD-MCNC: 9.7 G/DL (ref 12–16)
IMM GRANULOCYTES # BLD AUTO: 0.04 K/UL (ref 0–0.04)
IMM GRANULOCYTES NFR BLD AUTO: 0.5 % (ref 0–0.5)
LYMPHOCYTES # BLD AUTO: 1.8 K/UL (ref 1–4.8)
LYMPHOCYTES NFR BLD: 22.4 % (ref 18–48)
MCH RBC QN AUTO: 26 PG (ref 27–31)
MCHC RBC AUTO-ENTMCNC: 31.6 G/DL (ref 32–36)
MCV RBC AUTO: 82 FL (ref 82–98)
MONOCYTES # BLD AUTO: 0.7 K/UL (ref 0.3–1)
MONOCYTES NFR BLD: 8.7 % (ref 4–15)
NEUTROPHILS # BLD AUTO: 5.3 K/UL (ref 1.8–7.7)
NEUTROPHILS NFR BLD: 67.7 % (ref 38–73)
NRBC BLD-RTO: 0 /100 WBC
PLATELET # BLD AUTO: 303 K/UL (ref 150–450)
PMV BLD AUTO: 10.9 FL (ref 9.2–12.9)
RBC # BLD AUTO: 3.73 M/UL (ref 4–5.4)
WBC # BLD AUTO: 7.8 K/UL (ref 3.9–12.7)

## 2022-08-04 PROCEDURE — 86901 BLOOD TYPING SEROLOGIC RH(D): CPT | Performed by: STUDENT IN AN ORGANIZED HEALTH CARE EDUCATION/TRAINING PROGRAM

## 2022-08-04 PROCEDURE — 63600175 PHARM REV CODE 636 W HCPCS: Performed by: STUDENT IN AN ORGANIZED HEALTH CARE EDUCATION/TRAINING PROGRAM

## 2022-08-04 PROCEDURE — 85025 COMPLETE CBC W/AUTO DIFF WBC: CPT | Performed by: STUDENT IN AN ORGANIZED HEALTH CARE EDUCATION/TRAINING PROGRAM

## 2022-08-04 PROCEDURE — 11000001 HC ACUTE MED/SURG PRIVATE ROOM

## 2022-08-04 PROCEDURE — 25000003 PHARM REV CODE 250: Performed by: STUDENT IN AN ORGANIZED HEALTH CARE EDUCATION/TRAINING PROGRAM

## 2022-08-04 RX ORDER — VALACYCLOVIR HYDROCHLORIDE 500 MG/1
500 TABLET, FILM COATED ORAL 2 TIMES DAILY
Status: DISCONTINUED | OUTPATIENT
Start: 2022-08-04 | End: 2022-08-06 | Stop reason: HOSPADM

## 2022-08-04 RX ORDER — SODIUM CHLORIDE, SODIUM LACTATE, POTASSIUM CHLORIDE, CALCIUM CHLORIDE 600; 310; 30; 20 MG/100ML; MG/100ML; MG/100ML; MG/100ML
INJECTION, SOLUTION INTRAVENOUS CONTINUOUS
Status: DISCONTINUED | OUTPATIENT
Start: 2022-08-04 | End: 2022-08-06 | Stop reason: HOSPADM

## 2022-08-04 RX ORDER — OXYTOCIN/RINGER'S LACTATE 30/500 ML
0-30 PLASTIC BAG, INJECTION (ML) INTRAVENOUS CONTINUOUS
Status: DISCONTINUED | OUTPATIENT
Start: 2022-08-04 | End: 2022-08-05

## 2022-08-04 RX ADMIN — Medication 4 MILLI-UNITS/MIN: at 07:08

## 2022-08-04 RX ADMIN — SODIUM CHLORIDE, SODIUM LACTATE, POTASSIUM CHLORIDE, AND CALCIUM CHLORIDE: .6; .31; .03; .02 INJECTION, SOLUTION INTRAVENOUS at 07:08

## 2022-08-04 RX ADMIN — VALACYCLOVIR HYDROCHLORIDE 500 MG: 500 TABLET, FILM COATED ORAL at 08:08

## 2022-08-05 PROBLEM — B00.9 HERPES SIMPLEX TYPE 2 (HSV-2) INFECTION AFFECTING PREGNANCY, ANTEPARTUM: Status: RESOLVED | Noted: 2022-07-15 | Resolved: 2022-08-05

## 2022-08-05 PROBLEM — O98.519 HERPES SIMPLEX TYPE 2 (HSV-2) INFECTION AFFECTING PREGNANCY, ANTEPARTUM: Status: RESOLVED | Noted: 2022-07-15 | Resolved: 2022-08-05

## 2022-08-05 PROBLEM — O09.299 PREGNANCY WITH POOR REPRODUCTIVE HISTORY, ANTEPARTUM: Status: RESOLVED | Noted: 2017-03-30 | Resolved: 2022-08-05

## 2022-08-05 PROBLEM — Z34.90 ENCOUNTER FOR INDUCTION OF LABOR: Status: RESOLVED | Noted: 2022-08-04 | Resolved: 2022-08-05

## 2022-08-05 LAB
ALBUMIN SERPL BCP-MCNC: 2.6 G/DL (ref 3.5–5.2)
ALP SERPL-CCNC: 248 U/L (ref 55–135)
ALT SERPL W/O P-5'-P-CCNC: 8 U/L (ref 10–44)
ANION GAP SERPL CALC-SCNC: 10 MMOL/L (ref 8–16)
AST SERPL-CCNC: 20 U/L (ref 10–40)
BILIRUB SERPL-MCNC: 0.6 MG/DL (ref 0.1–1)
BUN SERPL-MCNC: 9 MG/DL (ref 6–20)
CALCIUM SERPL-MCNC: 9.3 MG/DL (ref 8.7–10.5)
CHLORIDE SERPL-SCNC: 107 MMOL/L (ref 95–110)
CO2 SERPL-SCNC: 20 MMOL/L (ref 23–29)
CREAT SERPL-MCNC: 0.7 MG/DL (ref 0.5–1.4)
EST. GFR  (NO RACE VARIABLE): >60 ML/MIN/1.73 M^2
GLUCOSE SERPL-MCNC: 87 MG/DL (ref 70–110)
POTASSIUM SERPL-SCNC: 4.4 MMOL/L (ref 3.5–5.1)
PROT SERPL-MCNC: 6.5 G/DL (ref 6–8.4)
SODIUM SERPL-SCNC: 137 MMOL/L (ref 136–145)

## 2022-08-05 PROCEDURE — 59409 PR OBSTETRICAL CARE,VAG DELIV ONLY: ICD-10-PCS | Mod: GB,,, | Performed by: OBSTETRICS & GYNECOLOGY

## 2022-08-05 PROCEDURE — 72100003 HC LABOR CARE, EA. ADDL. 8 HRS

## 2022-08-05 PROCEDURE — 63600175 PHARM REV CODE 636 W HCPCS: Performed by: ADVANCED PRACTICE MIDWIFE

## 2022-08-05 PROCEDURE — 59409 OBSTETRICAL CARE: CPT | Mod: AA,,, | Performed by: ANESTHESIOLOGY

## 2022-08-05 PROCEDURE — 62326 NJX INTERLAMINAR LMBR/SAC: CPT | Performed by: STUDENT IN AN ORGANIZED HEALTH CARE EDUCATION/TRAINING PROGRAM

## 2022-08-05 PROCEDURE — 63600175 PHARM REV CODE 636 W HCPCS: Performed by: STUDENT IN AN ORGANIZED HEALTH CARE EDUCATION/TRAINING PROGRAM

## 2022-08-05 PROCEDURE — 36415 COLL VENOUS BLD VENIPUNCTURE: CPT | Performed by: OBSTETRICS & GYNECOLOGY

## 2022-08-05 PROCEDURE — 11000001 HC ACUTE MED/SURG PRIVATE ROOM

## 2022-08-05 PROCEDURE — 80053 COMPREHEN METABOLIC PANEL: CPT | Performed by: OBSTETRICS & GYNECOLOGY

## 2022-08-05 PROCEDURE — 59409 PRA ETRICAL CARE,VAG DELIV ONLY: ICD-10-PCS | Mod: AA,,, | Performed by: ANESTHESIOLOGY

## 2022-08-05 PROCEDURE — 25000003 PHARM REV CODE 250: Performed by: STUDENT IN AN ORGANIZED HEALTH CARE EDUCATION/TRAINING PROGRAM

## 2022-08-05 PROCEDURE — C1751 CATH, INF, PER/CENT/MIDLINE: HCPCS | Performed by: STUDENT IN AN ORGANIZED HEALTH CARE EDUCATION/TRAINING PROGRAM

## 2022-08-05 PROCEDURE — 72200006 HC VAGINAL DELIVERY LEVEL III

## 2022-08-05 PROCEDURE — 27200710 HC EPIDURAL INFUSION PUMP SET: Performed by: STUDENT IN AN ORGANIZED HEALTH CARE EDUCATION/TRAINING PROGRAM

## 2022-08-05 PROCEDURE — 72100002 HC LABOR CARE, 1ST 8 HOURS

## 2022-08-05 PROCEDURE — 59409 OBSTETRICAL CARE: CPT | Mod: GB,,, | Performed by: OBSTETRICS & GYNECOLOGY

## 2022-08-05 PROCEDURE — 25000003 PHARM REV CODE 250: Performed by: ADVANCED PRACTICE MIDWIFE

## 2022-08-05 PROCEDURE — 51702 INSERT TEMP BLADDER CATH: CPT

## 2022-08-05 PROCEDURE — 25000003 PHARM REV CODE 250

## 2022-08-05 RX ORDER — SODIUM CHLORIDE, SODIUM LACTATE, POTASSIUM CHLORIDE, CALCIUM CHLORIDE 600; 310; 30; 20 MG/100ML; MG/100ML; MG/100ML; MG/100ML
INJECTION, SOLUTION INTRAVENOUS CONTINUOUS
Status: DISCONTINUED | OUTPATIENT
Start: 2022-08-05 | End: 2022-08-06 | Stop reason: HOSPADM

## 2022-08-05 RX ORDER — SODIUM CITRATE AND CITRIC ACID MONOHYDRATE 334; 500 MG/5ML; MG/5ML
30 SOLUTION ORAL ONCE
Status: CANCELLED | OUTPATIENT
Start: 2022-08-05 | End: 2022-08-05

## 2022-08-05 RX ORDER — PRENATAL WITH FERROUS FUM AND FOLIC ACID 3080; 920; 120; 400; 22; 1.84; 3; 20; 10; 1; 12; 200; 27; 25; 2 [IU]/1; [IU]/1; MG/1; [IU]/1; MG/1; MG/1; MG/1; MG/1; MG/1; MG/1; UG/1; MG/1; MG/1; MG/1; MG/1
1 TABLET ORAL DAILY
Status: DISCONTINUED | OUTPATIENT
Start: 2022-08-05 | End: 2022-08-06 | Stop reason: HOSPADM

## 2022-08-05 RX ORDER — FENTANYL/BUPIVACAINE/NS/PF 2MCG/ML-.1
PLASTIC BAG, INJECTION (ML) INJECTION CONTINUOUS PRN
Status: DISCONTINUED | OUTPATIENT
Start: 2022-08-05 | End: 2022-08-05

## 2022-08-05 RX ORDER — PROCHLORPERAZINE EDISYLATE 5 MG/ML
5 INJECTION INTRAMUSCULAR; INTRAVENOUS EVERY 6 HOURS PRN
Status: DISCONTINUED | OUTPATIENT
Start: 2022-08-05 | End: 2022-08-06 | Stop reason: HOSPADM

## 2022-08-05 RX ORDER — DIPHENHYDRAMINE HYDROCHLORIDE 50 MG/ML
12.5 INJECTION INTRAMUSCULAR; INTRAVENOUS EVERY 4 HOURS PRN
Status: CANCELLED | OUTPATIENT
Start: 2022-08-05

## 2022-08-05 RX ORDER — OXYTOCIN/RINGER'S LACTATE 30/500 ML
95 PLASTIC BAG, INJECTION (ML) INTRAVENOUS ONCE
Status: DISCONTINUED | OUTPATIENT
Start: 2022-08-05 | End: 2022-08-06 | Stop reason: HOSPADM

## 2022-08-05 RX ORDER — DOCUSATE SODIUM 100 MG/1
200 CAPSULE, LIQUID FILLED ORAL 2 TIMES DAILY PRN
Status: DISCONTINUED | OUTPATIENT
Start: 2022-08-05 | End: 2022-08-06 | Stop reason: HOSPADM

## 2022-08-05 RX ORDER — ONDANSETRON 8 MG/1
8 TABLET, ORALLY DISINTEGRATING ORAL EVERY 8 HOURS PRN
Status: DISCONTINUED | OUTPATIENT
Start: 2022-08-05 | End: 2022-08-06 | Stop reason: HOSPADM

## 2022-08-05 RX ORDER — SODIUM CHLORIDE 0.9 % (FLUSH) 0.9 %
10 SYRINGE (ML) INJECTION
Status: CANCELLED | OUTPATIENT
Start: 2022-08-05

## 2022-08-05 RX ORDER — ACETAMINOPHEN 325 MG/1
650 TABLET ORAL EVERY 6 HOURS PRN
Status: DISCONTINUED | OUTPATIENT
Start: 2022-08-05 | End: 2022-08-06 | Stop reason: HOSPADM

## 2022-08-05 RX ORDER — LIDOCAINE HYDROCHLORIDE AND EPINEPHRINE 15; 5 MG/ML; UG/ML
INJECTION, SOLUTION EPIDURAL
Status: DISCONTINUED | OUTPATIENT
Start: 2022-08-05 | End: 2022-08-05

## 2022-08-05 RX ORDER — FENTANYL/BUPIVACAINE/NS/PF 2MCG/ML-.1
PLASTIC BAG, INJECTION (ML) INJECTION
Status: COMPLETED
Start: 2022-08-05 | End: 2022-08-05

## 2022-08-05 RX ORDER — SIMETHICONE 80 MG
1 TABLET,CHEWABLE ORAL 4 TIMES DAILY PRN
Status: DISCONTINUED | OUTPATIENT
Start: 2022-08-05 | End: 2022-08-05

## 2022-08-05 RX ORDER — OXYTOCIN/RINGER'S LACTATE 30/500 ML
334 PLASTIC BAG, INJECTION (ML) INTRAVENOUS ONCE
Status: DISCONTINUED | OUTPATIENT
Start: 2022-08-05 | End: 2022-08-05

## 2022-08-05 RX ORDER — LIDOCAINE HYDROCHLORIDE 10 MG/ML
10 INJECTION INFILTRATION; PERINEURAL ONCE AS NEEDED
Status: DISCONTINUED | OUTPATIENT
Start: 2022-08-05 | End: 2022-08-06 | Stop reason: HOSPADM

## 2022-08-05 RX ORDER — FENTANYL CITRATE 50 UG/ML
INJECTION, SOLUTION INTRAMUSCULAR; INTRAVENOUS
Status: COMPLETED
Start: 2022-08-05 | End: 2022-08-05

## 2022-08-05 RX ORDER — FAMOTIDINE 10 MG/ML
20 INJECTION INTRAVENOUS ONCE
Status: CANCELLED | OUTPATIENT
Start: 2022-08-05 | End: 2022-08-05

## 2022-08-05 RX ORDER — TRANEXAMIC ACID 100 MG/ML
1000 INJECTION, SOLUTION INTRAVENOUS ONCE AS NEEDED
Status: DISCONTINUED | OUTPATIENT
Start: 2022-08-05 | End: 2022-08-06 | Stop reason: HOSPADM

## 2022-08-05 RX ORDER — FENTANYL/BUPIVACAINE/NS/PF 2MCG/ML-.1
10 PLASTIC BAG, INJECTION (ML) INJECTION CONTINUOUS
Status: CANCELLED | OUTPATIENT
Start: 2022-08-05

## 2022-08-05 RX ORDER — HYDROCORTISONE 25 MG/G
CREAM TOPICAL 3 TIMES DAILY PRN
Status: DISCONTINUED | OUTPATIENT
Start: 2022-08-05 | End: 2022-08-06 | Stop reason: HOSPADM

## 2022-08-05 RX ORDER — MISOPROSTOL 200 UG/1
800 TABLET ORAL
Status: DISCONTINUED | OUTPATIENT
Start: 2022-08-05 | End: 2022-08-06 | Stop reason: HOSPADM

## 2022-08-05 RX ORDER — DIPHENHYDRAMINE HYDROCHLORIDE 50 MG/ML
25 INJECTION INTRAMUSCULAR; INTRAVENOUS EVERY 4 HOURS PRN
Status: DISCONTINUED | OUTPATIENT
Start: 2022-08-05 | End: 2022-08-06 | Stop reason: HOSPADM

## 2022-08-05 RX ORDER — IBUPROFEN 600 MG/1
600 TABLET ORAL EVERY 6 HOURS
Status: DISCONTINUED | OUTPATIENT
Start: 2022-08-05 | End: 2022-08-06 | Stop reason: HOSPADM

## 2022-08-05 RX ORDER — DIPHENHYDRAMINE HCL 25 MG
25 CAPSULE ORAL EVERY 4 HOURS PRN
Status: DISCONTINUED | OUTPATIENT
Start: 2022-08-05 | End: 2022-08-06 | Stop reason: HOSPADM

## 2022-08-05 RX ORDER — CARBOPROST TROMETHAMINE 250 UG/ML
250 INJECTION, SOLUTION INTRAMUSCULAR
Status: DISCONTINUED | OUTPATIENT
Start: 2022-08-05 | End: 2022-08-06 | Stop reason: HOSPADM

## 2022-08-05 RX ORDER — PROCHLORPERAZINE EDISYLATE 5 MG/ML
5 INJECTION INTRAMUSCULAR; INTRAVENOUS EVERY 6 HOURS PRN
Status: CANCELLED | OUTPATIENT
Start: 2022-08-05

## 2022-08-05 RX ORDER — ONDANSETRON 8 MG/1
8 TABLET, ORALLY DISINTEGRATING ORAL EVERY 8 HOURS PRN
Status: CANCELLED | OUTPATIENT
Start: 2022-08-05

## 2022-08-05 RX ORDER — CALCIUM CARBONATE 200(500)MG
500 TABLET,CHEWABLE ORAL 3 TIMES DAILY PRN
Status: DISCONTINUED | OUTPATIENT
Start: 2022-08-05 | End: 2022-08-06 | Stop reason: HOSPADM

## 2022-08-05 RX ORDER — FENTANYL CITRATE 50 UG/ML
INJECTION, SOLUTION INTRAMUSCULAR; INTRAVENOUS
Status: DISCONTINUED | OUTPATIENT
Start: 2022-08-05 | End: 2022-08-05

## 2022-08-05 RX ORDER — HYDROCODONE BITARTRATE AND ACETAMINOPHEN 5; 325 MG/1; MG/1
1 TABLET ORAL EVERY 4 HOURS PRN
Status: DISCONTINUED | OUTPATIENT
Start: 2022-08-05 | End: 2022-08-06 | Stop reason: HOSPADM

## 2022-08-05 RX ORDER — SIMETHICONE 80 MG
1 TABLET,CHEWABLE ORAL EVERY 6 HOURS PRN
Status: DISCONTINUED | OUTPATIENT
Start: 2022-08-05 | End: 2022-08-06 | Stop reason: HOSPADM

## 2022-08-05 RX ORDER — MUPIROCIN 20 MG/G
OINTMENT TOPICAL
Status: DISCONTINUED | OUTPATIENT
Start: 2022-08-05 | End: 2022-08-06 | Stop reason: HOSPADM

## 2022-08-05 RX ORDER — HYDROCODONE BITARTRATE AND ACETAMINOPHEN 10; 325 MG/1; MG/1
1 TABLET ORAL EVERY 4 HOURS PRN
Status: DISCONTINUED | OUTPATIENT
Start: 2022-08-05 | End: 2022-08-06 | Stop reason: HOSPADM

## 2022-08-05 RX ORDER — DIPHENOXYLATE HYDROCHLORIDE AND ATROPINE SULFATE 2.5; .025 MG/1; MG/1
1 TABLET ORAL 4 TIMES DAILY PRN
Status: DISCONTINUED | OUTPATIENT
Start: 2022-08-05 | End: 2022-08-06 | Stop reason: HOSPADM

## 2022-08-05 RX ORDER — METHYLERGONOVINE MALEATE 0.2 MG/ML
200 INJECTION INTRAVENOUS
Status: DISCONTINUED | OUTPATIENT
Start: 2022-08-05 | End: 2022-08-06 | Stop reason: HOSPADM

## 2022-08-05 RX ADMIN — VALACYCLOVIR HYDROCHLORIDE 500 MG: 500 TABLET, FILM COATED ORAL at 09:08

## 2022-08-05 RX ADMIN — FENTANYL CITRATE 100 MCG: 50 INJECTION, SOLUTION INTRAMUSCULAR; INTRAVENOUS at 02:08

## 2022-08-05 RX ADMIN — METHYLERGONOVINE MALEATE 200 MCG: 0.2 INJECTION, SOLUTION INTRAMUSCULAR; INTRAVENOUS at 07:08

## 2022-08-05 RX ADMIN — BUPIVACAINE HYDROCHLORIDE 5 ML: 2.5 INJECTION, SOLUTION EPIDURAL; INFILTRATION; INTRACAUDAL; PERINEURAL at 02:08

## 2022-08-05 RX ADMIN — Medication 10 ML/HR: at 02:08

## 2022-08-05 RX ADMIN — IBUPROFEN 600 MG: 600 TABLET ORAL at 12:08

## 2022-08-05 RX ADMIN — SODIUM CHLORIDE, SODIUM LACTATE, POTASSIUM CHLORIDE, AND CALCIUM CHLORIDE: .6; .31; .03; .02 INJECTION, SOLUTION INTRAVENOUS at 02:08

## 2022-08-05 RX ADMIN — IBUPROFEN 600 MG: 600 TABLET ORAL at 06:08

## 2022-08-05 RX ADMIN — LIDOCAINE HYDROCHLORIDE,EPINEPHRINE BITARTRATE 3 ML: 15; .005 INJECTION, SOLUTION EPIDURAL; INFILTRATION; INTRACAUDAL; PERINEURAL at 02:08

## 2022-08-05 RX ADMIN — ONDANSETRON 8 MG: 8 TABLET, ORALLY DISINTEGRATING ORAL at 08:08

## 2022-08-05 NOTE — ANESTHESIA PREPROCEDURE EVALUATION
Klarissa Krause is a 31 y.o. female  with IUP at 38w6d weeks gestation who presents for      This IUP is complicated by  Language barrier- [place]. Hx shoulder dystocia previous delivery.    OB History    Para Term  AB Living   2 1 1     1   SAB IAB Ectopic Multiple Live Births         0 1      # Outcome Date GA Lbr Vlad/2nd Weight Sex Delivery Anes PTL Lv   2 Current            1 Term 17 39w2d  3.73 kg (8 lb 3.6 oz) F Vag-Spont EPI N SAMARIA      Complications: Shoulder Dystocia, Chorioamnionitis       Wt Readings from Last 1 Encounters:   22 1931 71.9 kg (158 lb 8.2 oz)       BP Readings from Last 3 Encounters:   22 118/82   22 110/72   22 116/74       Patient Active Problem List   Diagnosis    Pregnancy with poor reproductive history, antepartum    LGSIL on Pap smear of cervix - colpo mild.  repeat pap pp    HSV infection    Herpes simplex type 2 (HSV-2) infection affecting pregnancy, antepartum    Language barrier to communication    Encounter for induction of labor       History reviewed. No pertinent surgical history.    Social History     Socioeconomic History    Marital status: Single   Tobacco Use    Smoking status: Never Smoker    Smokeless tobacco: Never Used   Substance and Sexual Activity    Alcohol use: No    Drug use: No    Sexual activity: Yes     Birth control/protection: None         Chemistry        Component Value Date/Time     2017 0200    K 3.9 2017 0200     2017 0200    CO2 15 (L) 2017 0200    BUN 7 2017 0200    CREATININE 0.7 2017 0200    GLU 76 2017 0200        Component Value Date/Time    CALCIUM 9.5 2017 0200    ALKPHOS 240 (H) 2017 0200    AST 17 2017 0200    ALT 8 (L) 2017 0200    BILITOT 0.4 2017 0200    ESTGFRAFRICA >60 2017 0200    EGFRNONAA >60 2017 0200            Lab Results   Component Value Date    WBC 7.80 2022    HGB 9.7 (L)  08/04/2022    HCT 30.7 (L) 08/04/2022    MCV 82 08/04/2022     08/04/2022       No results for input(s): PT, INR, PROTIME, APTT in the last 72 hours.                Pre-op Assessment    I have reviewed the Patient Summary Reports.     I have reviewed the Nursing Notes.    I have reviewed the Medications.     Review of Systems  Anesthesia Hx:  No problems with previous Anesthesia Denies Hx of Anesthetic complications  History of prior surgery of interest to airway management or planning: Denies Family Hx of Anesthesia complications.   Denies Personal Hx of Anesthesia complications.   Hematology/Oncology:     Oncology Normal     Cardiovascular:   Denies Hypertension.   Denies Angina. ECG has been reviewed.    Pulmonary:   Denies Shortness of breath.  Denies Recent URI.    Renal/:  Renal/ Normal     Hepatic/GI:   Denies GERD. Denies Liver Disease.    Neurological:   Denies CVA. Denies Seizures.    Endocrine:  Endocrine Normal Denies Diabetes.        Physical Exam  General: Well nourished, Cooperative and Alert    Airway:  Mallampati: II   Mouth Opening: Normal  TM Distance: Normal  Tongue: Normal  Neck ROM: Normal ROM    Dental:  Intact        Anesthesia Plan  Type of Anesthesia, risks & benefits discussed:    Anesthesia Type: Gen ETT, Epidural, Spinal, CSE  Intra-op Monitoring Plan: Standard ASA Monitors  Post Op Pain Control Plan: multimodal analgesia  Induction:  IV  Airway Plan: Direct, Post-Induction  Informed Consent: Informed consent signed with the Patient and all parties understand the risks and agree with anesthesia plan.  All questions answered.   ASA Score: 2  Day of Surgery Review of History & Physical: H&P Update referred to the surgeon/provider.    Ready For Surgery From Anesthesia Perspective.     .

## 2022-08-05 NOTE — PROGRESS NOTES
"LABOR NOTE    Resident to bedside for routine cervical check.    S:  Complaints: Yes - pain with contractions.  Epidural working: N/A - declines epidural at this time    O: /83   Pulse 79   Temp 97.6 °F (36.4 °C) (Oral)   Resp 18   Ht 5' 3" (1.6 m)   Wt 71.9 kg (158 lb 8.2 oz)   SpO2 98%   Breastfeeding No   BMI 28.08 kg/m²     FHT: Cat 1 (reassuring), baseline 140, mod ramila, - accels, - decels  CTX: q 2 minutes, pit @ 6  SVE: 70/-2, AROM clear    TIMELINE:  0030: /-2, AROM clear, pit @ 6    A/P: 31 y.o.  at 40w3d, admitted for IOL    Labor management:  - Continue Close Maternal/Fetal Monitoring  - Pitocin Augmentation per protocol, pit @ 6  - Considering epidural placement  - Recheck 2-4 hours or PRN      Subha Bronson M.D.  OB/GYN PGY-4  "

## 2022-08-05 NOTE — ANESTHESIA PROCEDURE NOTES
Epidural    Patient location during procedure: OB   Reason for block: primary anesthetic   Reason for block: labor analgesia requested by patient and obstetrician  Diagnosis: IUP   Start time: 8/5/2022 2:22 AM  Timeout: 8/5/2022 2:21 AM  End time: 8/5/2022 2:31 AM    Staffing  Performing Provider: Balaji Casillas MD  Authorizing Provider: Balaji Casillas MD        Preanesthetic Checklist  Completed: patient identified, IV checked, site marked, risks and benefits discussed, surgical consent, monitors and equipment checked, pre-op evaluation, timeout performed, anesthesia consent given, hand hygiene performed and patient being monitored  Preparation  Patient position: sitting  Prep: ChloraPrep  Patient monitoring: Blood Pressure and Pulse Ox  Reason for block: primary anesthetic   Epidural  Skin Anesthetic: lidocaine 1%  Administration type: single shot  Approach: midline  Interspace: L4-5    Injection technique: SYLVIA saline  Block type: caudal.  Needle and Epidural Catheter  Needle type: Tuohy   Needle gauge: 17  Needle length: 3.5 inches  Needle insertion depth: 6 cm  Catheter type: springwLitigain  Catheter size: 19 G  Catheter at skin depth: 10 cm  Insertion Attempts: 1  Test dose: 3 mL of lidocaine 1.5% with Epi 1-to-200,000  Additional Documentation: incremental injection, no paresthesia on injection, no significant pain on injection, negative aspiration for heme and CSF, no signs/symptoms of IV or SA injection and no significant complaints from patient  Needle localization: anatomical landmarks  Assessment  Ease of block: easy  Patient's tolerance of the procedure: comfortable throughout block and no complaints No inadvertent dural puncture with Tuohy.  Dural puncture performed with spinal needle.

## 2022-08-05 NOTE — INTERVAL H&P NOTE
Klarissa Krause is 31 y.o.  at 40w3d wga presenting for IOL.     FHT: 140 bpm, moderate ramila, +accels, -decels; Cat 1 (reassuring)  [place]: irregular  Presentation: cephalic by ultrasound    SVE: 3/60/-3, soft, posterior  SSE: no evidence of herpetic lesions on external genitalia, vagina, or cervix.    Plan for labor augmentation with pitocin.      Active Hospital Problems    Diagnosis  POA    *Encounter for induction of labor [Z34.90]  Not Applicable    Language barrier to communication [Z78.9]  Yes    Herpes simplex type 2 (HSV-2) infection affecting pregnancy, antepartum [O98.519, B00.9]  Yes    Pregnancy with poor reproductive history, antepartum [O09.299]  Not Applicable     G1 - Shoulder dystocia          Resolved Hospital Problems   No resolved problems to display.       Subha Bronson M.D.  OB/GYN PGY-4

## 2022-08-05 NOTE — L&D DELIVERY NOTE
[facility] - Labor & Delivery  Vaginal Delivery   Operative Note    SUMMARY     This delivery was complicated by a shoulder dystocia    · After the head delivered, the right shoulder was noted to be anterior and held behind the pubic symphysis.  · Immediately at the identification of dystocia, I asked the nurse to note the time and call assistance to the room including additional residents, nursing staff and peditrics  ·  There was enough room for manuevers and an episiotomy was not required.  · Maneuvers Required to relieve the dystocia included: Bryant and delivery of the posterior arm  · Gentle downward traction and NO fundal pressure were used  · Pediatricians/ NICU were not in room at delivery  · APGARS were 9 at 1 minute and 9 at 5 minutes  · Birthweight 3930g  ·  was moving both arms without evidence of nerve injury at the time of delivery  · There was not evidence of clavicular fracture  · The Shoulder dystocia lasted a total of 14 seconds    Infant was placed on mothers abdomen for skin to skin and bulb suctioning performed.  Infant delivered position OA over intact perineum.  Nuchal cord: No.    Spontaneous delivery of placenta and IV pitocin given noting uterine atony with bleeding. IM Methergine given noting good uterine tone without bleeding.   No lacerations noted.  Patient tolerated delivery well. Sponge needle and lap counted correctly x2.    Indications: Encounter for induction of labor  Pregnancy complicated by:   Patient Active Problem List   Diagnosis    LGSIL on Pap smear of cervix - colpo mild.  repeat pap pp    HSV infection     (spontaneous vaginal delivery)    Language barrier to communication    Shoulder dystocia during labor and delivery, delivered     Admitting GA: 40w3d    Delivery Information for Keshia Krause    Birth information:  YOB: 2022   Time of birth: 7:28 AM   Sex: female   Head Delivery Date/Time: 2022  7:28 AM   Delivery type: Vaginal,  "Spontaneous   Gestational Age: 40w3d    Delivery Providers    Delivering clinician: Leti Sanders MD   Provider Role    Shonda Lindquist MD Resident    Michelle Guadalupe MD 2nd Call Resident    Pari Rashid RN Delivery Nurse    Betsey Moon RN Charge Nurse    [facility]    Leti Salas RN Nurse            Measurements    Weight: 3930 g  Weight (lbs): 8 lb 10.6 oz  Length: 51.4 cm  Length (in): 20.25"  Head circumference: 35.6 cm  Chest circumference: 35.6 cm         Apgars    Living status: Living  Apgars:  1 min.:  5 min.:  10 min.:  15 min.:  20 min.:    Skin color:  1  1       Heart rate:  2  2       Reflex irritability:  2  2       Muscle tone:  2  2       Respiratory effort:  2  2       Total:  9  9       Apgars assigned by: ANGELLA SALAS [facility]         Operative Delivery    Forceps attempted?: No  Vacuum extractor attempted?: No         Shoulder Dystocia    Shoulder dystocia present?: Yes  Anterior shoulder: right  Time recognized: 8/5/2022 07:27:46  Physician/Provider: Dr. Guadalupe/Dr. Sanders present throughout delivery   Additional staff: SYED Moon RN, JORGE Rashid RN, FAY Knight , TOMAS Lindquist MD   Gentle attempt at traction, assisted by maternal expulsive forces?: Yes   First maneuver: Bryant maneuver  First maneuver performed at: 8/5/2022 07:27:46  First maneuver performed by: KIKE Moon RN and OSIRIS Rashid RN  Second maneuver: delivery of posterior arm  Second maneuver performed at: 8/5/2022 07:28:00  Second maneuver performed by: Dr. Guadalupe           Presentation    Presentation: Vertex  Position: Left Occiput Anterior           Interventions/Resuscitation    Method: Bulb Suctioning, Deep Suctioning       Cord    Vessels: 3 vessels  Complications: None  Delayed Cord Clamping?: Yes  Cord Clamped Date/Time: 8/5/2022  7:30 AM  Cord Blood Disposition: Sent with Baby  Gases Sent?: No  Stem Cell Collection (by MD): No       Placenta    Placenta delivery date/time: 8/5/2022 " 0738  Placenta removal: Expressed  Placenta appearance: Intact  Placenta disposition: discarded           Labor Events:       labor: No     Labor Onset Date/Time:         Dilation Complete Date/Time:         Start Pushing Date/Time:         Start Pushing Date/Time:       Rupture Date/Time: 22         Rupture type:          Fluid Amount:       Fluid Color: Clear      Fluid Odor:       Membrane Status: ARM (Artificial Rupture)               steroids: None     Antibiotics given for GBS: No     Induction: oxytocin     Indications for induction:  Elective     Augmentation:       Indications for augmentation:       Labor complications: Shoulder Dystocia     Additional complications:          Cervical ripening:                     Delivery:      Episiotomy: None     Indication for Episiotomy:       Perineal Lacerations: None Repaired:      Periurethral Laceration:   Repaired:     Labial Laceration:   Repaired:     Sulcus Laceration:   Repaired:     Vaginal Laceration:   Repaired:     Cervical Laceration:   Repaired:     Repair suture: None     Repair # of packets: 0     Last Value - EBL - Nursing (mL):       Sum - EBL - Nursing (mL): 0     Last Value - EBL - Anesthesia (mL):      Calculated QBL (mL): 350      Vaginal Sweep Performed: Yes     Surgicount Correct: Yes       Other providers:       Anesthesia    Method: Epidural          Details (if applicable):  Trial of Labor      Categorization:      Priority:     Indications for :     Incision Type:       Additional  information:  Forceps:    Vacuum:    Breech:    Observed anomalies    Other (Comments):           Shonda Lindquist MD  [facility]   OBGYN PGY1

## 2022-08-06 VITALS
WEIGHT: 158.5 LBS | HEART RATE: 67 BPM | DIASTOLIC BLOOD PRESSURE: 69 MMHG | HEIGHT: 63 IN | BODY MASS INDEX: 28.08 KG/M2 | SYSTOLIC BLOOD PRESSURE: 105 MMHG | RESPIRATION RATE: 18 BRPM | OXYGEN SATURATION: 99 % | TEMPERATURE: 98 F

## 2022-08-06 LAB
BASOPHILS # BLD AUTO: 0.03 K/UL (ref 0–0.2)
BASOPHILS NFR BLD: 0.3 % (ref 0–1.9)
DIFFERENTIAL METHOD: ABNORMAL
EOSINOPHIL # BLD AUTO: 0.1 K/UL (ref 0–0.5)
EOSINOPHIL NFR BLD: 0.6 % (ref 0–8)
ERYTHROCYTE [DISTWIDTH] IN BLOOD BY AUTOMATED COUNT: 15 % (ref 11.5–14.5)
HCT VFR BLD AUTO: 24.7 % (ref 37–48.5)
HGB BLD-MCNC: 7.6 G/DL (ref 12–16)
IMM GRANULOCYTES # BLD AUTO: 0.06 K/UL (ref 0–0.04)
IMM GRANULOCYTES NFR BLD AUTO: 0.6 % (ref 0–0.5)
LYMPHOCYTES # BLD AUTO: 1.8 K/UL (ref 1–4.8)
LYMPHOCYTES NFR BLD: 17.2 % (ref 18–48)
MCH RBC QN AUTO: 25.5 PG (ref 27–31)
MCHC RBC AUTO-ENTMCNC: 30.8 G/DL (ref 32–36)
MCV RBC AUTO: 83 FL (ref 82–98)
MONOCYTES # BLD AUTO: 0.9 K/UL (ref 0.3–1)
MONOCYTES NFR BLD: 8.6 % (ref 4–15)
NEUTROPHILS # BLD AUTO: 7.6 K/UL (ref 1.8–7.7)
NEUTROPHILS NFR BLD: 72.7 % (ref 38–73)
NRBC BLD-RTO: 0 /100 WBC
PLATELET # BLD AUTO: 213 K/UL (ref 150–450)
PMV BLD AUTO: 11.4 FL (ref 9.2–12.9)
RBC # BLD AUTO: 2.98 M/UL (ref 4–5.4)
WBC # BLD AUTO: 10.5 K/UL (ref 3.9–12.7)

## 2022-08-06 PROCEDURE — 25000003 PHARM REV CODE 250

## 2022-08-06 PROCEDURE — 99238 PR HOSPITAL DISCHARGE DAY,<30 MIN: ICD-10-PCS | Mod: ,,, | Performed by: OBSTETRICS & GYNECOLOGY

## 2022-08-06 PROCEDURE — 25000003 PHARM REV CODE 250: Performed by: STUDENT IN AN ORGANIZED HEALTH CARE EDUCATION/TRAINING PROGRAM

## 2022-08-06 PROCEDURE — 99238 HOSP IP/OBS DSCHRG MGMT 30/<: CPT | Mod: ,,, | Performed by: OBSTETRICS & GYNECOLOGY

## 2022-08-06 PROCEDURE — 36415 COLL VENOUS BLD VENIPUNCTURE: CPT | Performed by: OBSTETRICS & GYNECOLOGY

## 2022-08-06 PROCEDURE — 85025 COMPLETE CBC W/AUTO DIFF WBC: CPT | Performed by: OBSTETRICS & GYNECOLOGY

## 2022-08-06 RX ORDER — IBUPROFEN 600 MG/1
600 TABLET ORAL EVERY 6 HOURS
Qty: 30 TABLET | Refills: 1 | Status: SHIPPED | OUTPATIENT
Start: 2022-08-06

## 2022-08-06 RX ORDER — FERROUS SULFATE 325(65) MG
325 TABLET, DELAYED RELEASE (ENTERIC COATED) ORAL DAILY
Qty: 30 TABLET | Refills: 3 | Status: SHIPPED | OUTPATIENT
Start: 2022-08-06

## 2022-08-06 RX ORDER — DOCUSATE SODIUM 100 MG/1
200 CAPSULE, LIQUID FILLED ORAL 2 TIMES DAILY PRN
Qty: 30 CAPSULE | Refills: 1 | Status: SHIPPED | OUTPATIENT
Start: 2022-08-06

## 2022-08-06 RX ORDER — LANOLIN ALCOHOL/MO/W.PET/CERES
1 CREAM (GRAM) TOPICAL DAILY
Status: DISCONTINUED | OUTPATIENT
Start: 2022-08-06 | End: 2022-08-06 | Stop reason: HOSPADM

## 2022-08-06 RX ADMIN — IBUPROFEN 600 MG: 600 TABLET ORAL at 08:08

## 2022-08-06 RX ADMIN — IBUPROFEN 600 MG: 600 TABLET ORAL at 03:08

## 2022-08-06 RX ADMIN — VALACYCLOVIR HYDROCHLORIDE 500 MG: 500 TABLET, FILM COATED ORAL at 08:08

## 2022-08-06 RX ADMIN — HYDROCODONE BITARTRATE AND ACETAMINOPHEN 1 TABLET: 5; 325 TABLET ORAL at 03:08

## 2022-08-06 RX ADMIN — PRENATAL VIT W/ FE FUMARATE-FA TAB 27-0.8 MG 1 TABLET: 27-0.8 TAB at 08:08

## 2022-08-06 RX ADMIN — IBUPROFEN 600 MG: 600 TABLET ORAL at 02:08

## 2022-08-06 RX ADMIN — FERROUS SULFATE TAB 325 MG (65 MG ELEMENTAL FE) 1 EACH: 325 (65 FE) TAB at 08:08

## 2022-08-06 NOTE — PROGRESS NOTES
POSTPARTUM PROGRESS NOTE    Subjective:     PPD/POD#: 1   Procedure:    EGA: 40w3d   N/V: No   F/C: No   Abd Pain: Mild, well-controlled with oral pain medication   Lochia: Mild   Voiding: Yes   Ambulating: Yes   Bowel fnc: Yes   Breastfeeding: Yes   Contraception: Interval bilateral salpingectomy   Circumcision: N/A, female     Objective:      Temp:  [97.4 °F (36.3 °C)-98.7 °F (37.1 °C)] 97.9 °F (36.6 °C)  Pulse:  [58-92] 72  Resp:  [14-18] 16  SpO2:  [95 %-99 %] 98 %  BP: (112-137)/(67-86) 113/67    Lung: Normal respiratory effort   Abdomen: Soft, appropriately tender   Uterus: Firm, no fundal tenderness   Incision: N/A   : Deferred   Extremities: Bilateral trace edema     Lab Review    Recent Labs   Lab 22  1025      K 4.4      CO2 20*   BUN 9   CREATININE 0.7   GLU 87   PROT 6.5   BILITOT 0.6   ALKPHOS 248*   ALT 8*   AST 20       Recent Labs   Lab 22  1913   WBC 7.80   HGB 9.7*   HCT 30.7*   MCV 82            I/O    Intake/Output Summary (Last 24 hours) at 2022 0350  Last data filed at 2022 1908  Gross per 24 hour   Intake 1302.49 ml   Output 2250 ml   Net -947.51 ml        Assessment and Plan:   Postpartum care:  - Patient doing well.  - Continue routine management and advances.    Anemia  - H/H 7.6  - QBL: 350mL  - asymptomatic  - iron/colace    Undesired fertility  -Interval BTL  -Medicaid forms signed in clinic    Shonda Lindquist MD  [facility]   OBGYN PGY1

## 2022-08-06 NOTE — NURSING
The following message was sent to [facility] staff:   Hi, can someone please call ms ybarra to schedule a post partum mood check (pt self reported a history of having pp depression after her first child, stated she took medication at the time but is not on any meds for depression now) thank you . Pt is [place] speaking only

## 2022-08-06 NOTE — ANESTHESIA POSTPROCEDURE EVALUATION
Anesthesia Post Evaluation    Patient: Klarissa Krause    Procedure(s) Performed: * No procedures listed *    Final Anesthesia Type: epidural      Patient location during evaluation: floor  Patient participation: Yes- Able to Participate  Level of consciousness: awake and alert and oriented  Post-procedure vital signs: reviewed and stable  Pain management: adequate  Airway patency: patent  REBECCA mitigation strategies: Multimodal analgesia  PONV status at discharge: No PONV  Anesthetic complications: no      Cardiovascular status: hemodynamically stable and blood pressure returned to baseline  Respiratory status: unassisted, spontaneous ventilation and room air  Hydration status: euvolemic  Follow-up not needed.          Vitals Value Taken Time   /69 08/06/22 0818   Temp 36.6 °C (97.9 °F) 08/06/22 0818   Pulse 67 08/06/22 0818   Resp 18 08/06/22 0818   SpO2 99 % 08/06/22 0818         No case tracking events are documented in the log.      Pain/Gulshan Score: Pain Rating Prior to Med Admin: 0 (8/6/2022  8:28 AM)  Pain Rating Post Med Admin: 2 (8/6/2022  4:04 AM)

## 2022-08-06 NOTE — PROGRESS NOTES
08/06/22 1045   Maternal Assessment   Breast Shape Left:;round   Breast Density Left:;soft   Areola Left:;elastic   Nipples Left:;everted   Maternal Infant Feeding   Maternal Emotional State assist needed   Infant Positioning cross-cradle   Signs of Milk Transfer infant jaw motion present  (colostrum easily expressed)   Pain with Feeding yes   Pain Location nipple, left   Comfort Measures Before/During Feeding infant position adjusted;latch adjusted   Latch Assistance yes   Lactation Referrals   Lactation Referrals outpatient lactation program;support group  (Hebrew hand out given)   Discharge lactation education given with use of LIA and . Questions answered. Pt attempting to breastfeed baby on left breast. Pt c/o of more tender nipple. Assisted pt with achieving a deeper latch. Pt shown how to use cross cradle position vs cradle hold. Colostrum easily expressed to tip of nipple. Good tugs and pulls given. Breastpump Prescription and listing of dme given to pt. Information given on how to obtain breastpump. Pt has lactation contact number for any further questions.

## 2022-08-06 NOTE — DISCHARGE SUMMARY
Delivery Discharge Summary  Obstetrics      Primary OB Clinician: Leti Sanders MD      Admission date: 2022  Discharge date: 2022    Disposition: To home, self care    Discharge Diagnosis List:      Patient Active Problem List   Diagnosis    LGSIL on Pap smear of cervix - colpo mild.  repeat pap pp    HSV infection     (spontaneous vaginal delivery)    Language barrier to communication    Shoulder dystocia during labor and delivery, delivered       Procedure:     Hospital Course:  Klarissa Krause is a 31 y.o. now , PPD #1 who was admitted on 2022 at 40w3d for IOL. Patient was subsequently admitted to labor and delivery unit with signed consents.     Labor course was uncomplicated and resulted in  without complications.     Please see delivery note for further details. Her postpartum course was uncomplicated. Patient was found to be anemic and started on iron and colace. On discharge day, patient's pain is controlled with oral pain medications. Pt is tolerating ambulation without SOB or CP, and regular diet without N/V. Reports lochia is mild. Denies any HA, vision changes, F/C, LE swelling. Denies any breast pain/soreness.    Pt in stable condition and ready for discharge. She has been instructed to start and/or continue medications and follow up with her obstetrics provider as listed below.    Pertinent studies:  CBC  Recent Labs   Lab 22  1913 22  0422   WBC 7.80 10.50   HGB 9.7* 7.6*   HCT 30.7* 24.7*   MCV 82 83    213          There is no immunization history for the selected administration types on file for this patient.     Delivery:    Episiotomy: None   Lacerations: None   Repair suture: None   Repair # of packets: 0   Blood loss (ml):       Birth information:  YOB: 2022   Time of birth: 7:28 AM   Sex: female   Delivery type: Vaginal, Spontaneous   Gestational Age: 40w3d    Delivery Clinician:      Other providers:       Additional   information:  Forceps:    Vacuum:    Breech:    Observed anomalies      Living?:           APGARS  One minute Five minutes Ten minutes   Skin color:         Heart rate:         Grimace:         Muscle tone:         Breathing:         Totals: 9  9        Placenta: Delivered:       appearance      Patient Instructions:   Current Discharge Medication List      START taking these medications    Details   docusate sodium (COLACE) 100 MG capsule Take 2 capsules (200 mg total) by mouth 2 (two) times daily as needed for Constipation.  Qty: 30 capsule, Refills: 1      ferrous sulfate 325 (65 FE) MG EC tablet Take 1 tablet (325 mg total) by mouth once daily.  Qty: 30 tablet, Refills: 3      ibuprofen (ADVIL,MOTRIN) 600 MG tablet Take 1 tablet (600 mg total) by mouth every 6 (six) hours.  Qty: 30 tablet, Refills: 1         CONTINUE these medications which have NOT CHANGED    Details   PRENATAL 19 29 mg iron- 1 mg Chew TK 1 T PO QD  Refills: 2      valACYclovir (VALTREX) 500 MG tablet Take 1 tablet (500 mg total) by mouth 2 (two) times daily.  Qty: 60 tablet, Refills: 0    Associated Diagnoses: HSV infection             Discharge Procedure Orders   Diet Adult Regular     Lifting restrictions   Order Comments: No lifting more than infant for six weeks.     Other restrictions (specify):     No driving until:   Order Comments: Comfortable stepping on gas and brakes     Pelvic Rest   Order Comments: Nothing in vagina, including intercourse, for at least six weeks     Notify your health care provider if you experience any of the following:  temperature >100.4     Notify your health care provider if you experience any of the following:  persistent nausea and vomiting or diarrhea     Notify your health care provider if you experience any of the following:  severe uncontrolled pain     Notify your health care provider if you experience any of the following:  redness, tenderness, or signs of infection (pain, swelling, redness, odor or  green/yellow discharge around incision site)     Notify your health care provider if you experience any of the following:  severe persistent headache     Notify your health care provider if you experience any of the following:  persistent dizziness, light-headedness, or visual disturbances     Notify your health care provider if you experience any of the following:  increased confusion or weakness     Notify your health care provider if you experience any of the following:   Order Comments: Heavy vaginal bleeding, saturating more than two pads in one hour     Activity as tolerated        Follow-up Information     [facility] Follow up in 1 week(s).    Specialty: Obstetrics and Gynecology  Why: BP check  Contact information:  [address], [address]  [facility] [address] [phone]                        Karlee Amor MD  PGY-2 OB/GYN

## 2022-08-11 ENCOUNTER — POSTPARTUM VISIT (OUTPATIENT)
Dept: OBSTETRICS AND GYNECOLOGY | Facility: CLINIC | Age: 32
End: 2022-08-11
Payer: MEDICAID

## 2022-08-11 VITALS
SYSTOLIC BLOOD PRESSURE: 112 MMHG | BODY MASS INDEX: 26.41 KG/M2 | HEIGHT: 63 IN | WEIGHT: 149.06 LBS | DIASTOLIC BLOOD PRESSURE: 80 MMHG

## 2022-08-11 DIAGNOSIS — Z01.30 BP CHECK: Primary | ICD-10-CM

## 2022-08-11 PROCEDURE — 99999 PR PBB SHADOW E&M-EST. PATIENT-LVL III: CPT | Mod: PBBFAC,,,

## 2022-08-11 PROCEDURE — 99213 OFFICE O/P EST LOW 20 MIN: CPT | Mod: PBBFAC

## 2022-08-11 PROCEDURE — 99999 PR PBB SHADOW E&M-EST. PATIENT-LVL III: ICD-10-PCS | Mod: PBBFAC,,,

## 2022-08-11 PROCEDURE — 99212 OFFICE O/P EST SF 10 MIN: CPT | Mod: S$PBB,,, | Performed by: REGISTERED NURSE

## 2022-08-11 PROCEDURE — 99212 PR OFFICE/OUTPT VISIT, EST, LEVL II, 10-19 MIN: ICD-10-PCS | Mod: S$PBB,,, | Performed by: REGISTERED NURSE

## 2022-08-11 NOTE — PROGRESS NOTES
Postpartum BP Check  Pt presents for PP BP check. Pt delivered via  on 22 @ 40w3d. Pregnancy was complicated by shoulder dystocia. She was told to have a 1 week BP check. Pt reports HA and slight blurry vision that began today. She reports she got no sleep last night and had not tried taking Tylenol. She has also had nothing to drink today. Pt denies epigastric pain, SOB, chest pain, swelling has subsided. BP today was 112/80. No other complaints or concerns noted.     OB History    Para Term  AB Living   2 2 2     2   SAB IAB Ectopic Multiple Live Births         0 2      # Outcome Date GA Lbr Vlad/2nd Weight Sex Delivery Anes PTL Lv   2 Term 22 40w3d  3.93 kg (8 lb 10.6 oz) F Vag-Spont EPI N SAMARIA      Complications: Shoulder Dystocia   1 Term 17 39w2d  3.73 kg (8 lb 3.6 oz) F Vag-Spont EPI N SAMARIA      Complications: Shoulder Dystocia, Chorioamnionitis        Baby's course has been uncomplicated.     ROS:  GENERAL: No fever, chills, fatigability.  VULVAR: No pain, no lesions and no itching.  VAGINAL: No relaxation, no itching, no discharge, no abnormal bleeding and no lesions.  ABDOMEN: No abdominal pain. Denies nausea. Denies vomiting. No diarrhea. No constipation.  BREAST: Denies pain. No lumps. No discharge.  URINARY: No incontinence, no nocturia, no frequency and no dysuria.  CARDIOVASCULAR: No chest pain. No shortness of breath. No leg cramps.  NEUROLOGICAL: + headache; + slight blurry vision.      General appearance - alert, well appearing, and in no distress, oriented to person, place, and time and normal appearing weight  Mental status - alert, oriented to person, place, and time, normal mood, behavior, speech, dress, motor activity, and thought processes, affect appropriate to mood  Skin - coloration normal for race, good turgor, warm to touch, no rashes  Pelvic - deferred  Extremities - +1 bilateral pedal edema, no redness or tenderness in the calves or  thighs        Diagnosis:  1. BP check      PLAN:  Spoke with patient's primary OBGYN, who recommends take 1 g Tylenol. If HA persists, report to [facility]. This was communicated to the patient and her partner who verbalize understanding.  [facility] precautions given.        SILVIA Aburto

## 2022-08-31 ENCOUNTER — POSTPARTUM VISIT (OUTPATIENT)
Dept: OBSTETRICS AND GYNECOLOGY | Facility: CLINIC | Age: 32
End: 2022-08-31
Payer: MEDICAID

## 2022-08-31 VITALS
WEIGHT: 140 LBS | HEIGHT: 63 IN | BODY MASS INDEX: 24.8 KG/M2 | SYSTOLIC BLOOD PRESSURE: 94 MMHG | DIASTOLIC BLOOD PRESSURE: 62 MMHG

## 2022-08-31 DIAGNOSIS — Z30.017 ENCOUNTER FOR INITIAL PRESCRIPTION OF IMPLANTABLE SUBDERMAL CONTRACEPTIVE: ICD-10-CM

## 2022-08-31 DIAGNOSIS — Z12.4 CERVICAL CANCER SCREENING: ICD-10-CM

## 2022-08-31 PROCEDURE — 99999 PR PBB SHADOW E&M-EST. PATIENT-LVL III: CPT | Mod: PBBFAC,,, | Performed by: OBSTETRICS & GYNECOLOGY

## 2022-08-31 PROCEDURE — 99213 OFFICE O/P EST LOW 20 MIN: CPT | Mod: PBBFAC,PN | Performed by: OBSTETRICS & GYNECOLOGY

## 2022-08-31 PROCEDURE — 87624 HPV HI-RISK TYP POOLED RSLT: CPT | Performed by: OBSTETRICS & GYNECOLOGY

## 2022-08-31 PROCEDURE — 59430 PR CARE AFTER DELIVERY ONLY: ICD-10-PCS | Mod: ,,, | Performed by: OBSTETRICS & GYNECOLOGY

## 2022-08-31 PROCEDURE — 99999 PR PBB SHADOW E&M-EST. PATIENT-LVL III: ICD-10-PCS | Mod: PBBFAC,,, | Performed by: OBSTETRICS & GYNECOLOGY

## 2022-08-31 PROCEDURE — 88175 CYTOPATH C/V AUTO FLUID REDO: CPT | Performed by: OBSTETRICS & GYNECOLOGY

## 2022-08-31 NOTE — PROGRESS NOTES
"Past medical, surgical, social, family, and obstetric histories; medications; prior records and results; and available outside records were reviewed and updated in the EMR.  Pertinent findings were noted below.    Reason for Visit   Postpartum Care    HPI   32 y.o. female     No LMP recorded.    Delivery:  on 2022 c/b shoulder dystocia  Hospitalization: uncomplicated  Ambulating, tolerating Po, moving bowels: Y  Pain controlled: Y  Bleeding: N  Breastfeeding: Y  Breast pain or engorgement: N  Postpartum depression: N  Incision: N  Contraception:  desires Nexplanon    Pap: No result found, Done today  Mammogram: N/A  Allergies: Patient has no known allergies.    Exam   BP 94/62   Ht 5' 3" (1.6 m)   Wt 63.5 kg (139 lb 15.9 oz)   Breastfeeding Yes   BMI 24.80 kg/m²     Physical Exam  Genitourinary:      Vulva normal.      Right Labia: No rash, lesions or Bartholin's cyst.     Left Labia: No lesions, Bartholin's cyst or rash.     No vaginal discharge or bleeding.        Right Adnexa: not tender and not full.     Left Adnexa: not tender and not full.     No cervical motion tenderness, discharge or lesion.      Uterus is not enlarged or tender.      Pelvic exam was performed with patient in the lithotomy position.   Exam conducted with a chaperone present.     Assessment and Plan   Postpartum exam    Cervical cancer screening  -     Liquid-Based Pap Smear, Screening  -     HPV High Risk Genotypes, PCR    Encounter for initial prescription of implantable subdermal contraceptive  -     Device Authorization Order    Postpartum  Doing well  Exam: WNL  Mood / depression screening: score 7, denies any issues  Lactation referral: N/A  Contraception: scheduled for Nexplanon placement    Follow-up: for annual WWE or PRN     "

## 2022-09-01 ENCOUNTER — TELEPHONE (OUTPATIENT)
Dept: OBSTETRICS AND GYNECOLOGY | Facility: CLINIC | Age: 32
End: 2022-09-01
Payer: MEDICAID

## 2022-09-01 NOTE — TELEPHONE ENCOUNTER
Called with  to see if patient could come in today (9/1) for Nexplanon placement as her device is already approved. No answer on either phone numbers listed in Epic. Voice message left on both numbers with assistance of .

## 2022-09-10 LAB
CLINICAL INFO: NORMAL
CYTO CVX: NORMAL
CYTOLOGIST CVX/VAG CYTO: NORMAL
CYTOLOGIST CVX/VAG CYTO: NORMAL
CYTOLOGY CMNT CVX/VAG CYTO-IMP: NORMAL
CYTOLOGY PAP THIN PREP EXPLANATION: NORMAL
DATE OF PREVIOUS PAP: NO
DATE PREVIOUS BX: NO
GEN CATEG CVX/VAG CYTO-IMP: NORMAL
HPV I/H RISK 4 DNA CVX QL NAA+PROBE: NOT DETECTED
LMP START DATE: NORMAL
MICROORGANISM CVX/VAG CYTO: NORMAL
PATHOLOGIST CVX/VAG CYTO: NORMAL
SERVICE CMNT-IMP: NORMAL
SPECIMEN SOURCE CVX/VAG CYTO: NORMAL
STAT OF ADQ CVX/VAG CYTO-IMP: NORMAL